# Patient Record
Sex: MALE | Race: WHITE | NOT HISPANIC OR LATINO | URBAN - METROPOLITAN AREA
[De-identification: names, ages, dates, MRNs, and addresses within clinical notes are randomized per-mention and may not be internally consistent; named-entity substitution may affect disease eponyms.]

---

## 2016-07-02 LAB
CHOLESTEROL (EXTERNAL): 208 MG/DL (ref 0–200)
CREATININE (EXTERNAL): 1 MG/DL (ref 0.4–1.3)
GFR ESTIMATED (EXTERNAL): NORMAL ML/MIN/1.73M2 (ref 0–0)
GFR ESTIMATED (IF AFRICAN AMERICAN) (EXTERNAL): NORMAL ML/MIN/1.73M2 (ref 0–0)
HDLC SERPL-MCNC: 45 MG/DL (ref 39–100)
LDL CHOLESTEROL CALCULATED (EXTERNAL): 113 MG/DL (ref 19–130)
NON HDL CHOLESTEROL (EXTERNAL): ABNORMAL MG/DL
TRIGLYCERIDES (EXTERNAL): 248 MG/DL (ref 0–149)

## 2020-02-17 ENCOUNTER — TRANSFERRED RECORDS (OUTPATIENT)
Dept: FAMILY MEDICINE | Facility: CLINIC | Age: 50
End: 2020-02-17

## 2020-02-17 LAB — HIV 1&2 EXT: NORMAL

## 2022-01-18 ENCOUNTER — OFFICE VISIT (OUTPATIENT)
Dept: FAMILY MEDICINE | Facility: CLINIC | Age: 52
End: 2022-01-18

## 2022-01-18 VITALS
HEIGHT: 72 IN | SYSTOLIC BLOOD PRESSURE: 136 MMHG | OXYGEN SATURATION: 97 % | DIASTOLIC BLOOD PRESSURE: 82 MMHG | HEART RATE: 63 BPM | WEIGHT: 199 LBS | BODY MASS INDEX: 26.95 KG/M2

## 2022-01-18 DIAGNOSIS — Z11.59 NEED FOR HEPATITIS C SCREENING TEST: ICD-10-CM

## 2022-01-18 DIAGNOSIS — F41.9 ANXIETY: ICD-10-CM

## 2022-01-18 DIAGNOSIS — Z00.00 ROUTINE GENERAL MEDICAL EXAMINATION AT A HEALTH CARE FACILITY: Primary | ICD-10-CM

## 2022-01-18 DIAGNOSIS — E78.5 HYPERLIPIDEMIA, UNSPECIFIED HYPERLIPIDEMIA TYPE: ICD-10-CM

## 2022-01-18 DIAGNOSIS — R73.01 IMPAIRED FASTING GLUCOSE: ICD-10-CM

## 2022-01-18 DIAGNOSIS — Z72.0 CHEWS TOBACCO REGULARLY: ICD-10-CM

## 2022-01-18 DIAGNOSIS — F32.0 CURRENT MILD EPISODE OF MAJOR DEPRESSIVE DISORDER WITHOUT PRIOR EPISODE (H): ICD-10-CM

## 2022-01-18 DIAGNOSIS — I48.0 PAROXYSMAL ATRIAL FIBRILLATION (H): ICD-10-CM

## 2022-01-18 PROBLEM — I48.91 ATRIAL FIBRILLATION (H): Status: ACTIVE | Noted: 2021-05-08

## 2022-01-18 PROBLEM — G47.33 MILD OBSTRUCTIVE SLEEP APNEA: Status: ACTIVE | Noted: 2021-09-18

## 2022-01-18 PROCEDURE — 36415 COLL VENOUS BLD VENIPUNCTURE: CPT | Performed by: FAMILY MEDICINE

## 2022-01-18 PROCEDURE — 99386 PREV VISIT NEW AGE 40-64: CPT | Performed by: FAMILY MEDICINE

## 2022-01-18 RX ORDER — BECLOMETHASONE DIPROPIONATE HFA 80 UG/1
AEROSOL, METERED RESPIRATORY (INHALATION)
COMMUNITY
Start: 2020-10-16 | End: 2022-10-20

## 2022-01-18 RX ORDER — FLUTICASONE PROPIONATE 50 MCG
2 SPRAY, SUSPENSION (ML) NASAL
COMMUNITY

## 2022-01-18 RX ORDER — CHLORAL HYDRATE 500 MG
2 CAPSULE ORAL
COMMUNITY
Start: 2020-02-25 | End: 2024-06-07

## 2022-01-18 RX ORDER — ESCITALOPRAM OXALATE 20 MG/1
1 TABLET ORAL DAILY
COMMUNITY
Start: 2021-07-10 | End: 2022-10-20

## 2022-01-18 RX ORDER — FENOFIBRATE 160 MG/1
1 TABLET ORAL DAILY
COMMUNITY
Start: 2021-08-10 | End: 2022-10-20

## 2022-01-18 ASSESSMENT — ANXIETY QUESTIONNAIRES
IF YOU CHECKED OFF ANY PROBLEMS ON THIS QUESTIONNAIRE, HOW DIFFICULT HAVE THESE PROBLEMS MADE IT FOR YOU TO DO YOUR WORK, TAKE CARE OF THINGS AT HOME, OR GET ALONG WITH OTHER PEOPLE: SOMEWHAT DIFFICULT
5. BEING SO RESTLESS THAT IT IS HARD TO SIT STILL: NOT AT ALL
2. NOT BEING ABLE TO STOP OR CONTROL WORRYING: NOT AT ALL
GAD7 TOTAL SCORE: 2
3. WORRYING TOO MUCH ABOUT DIFFERENT THINGS: NOT AT ALL
1. FEELING NERVOUS, ANXIOUS, OR ON EDGE: SEVERAL DAYS
6. BECOMING EASILY ANNOYED OR IRRITABLE: NOT AT ALL
7. FEELING AFRAID AS IF SOMETHING AWFUL MIGHT HAPPEN: NOT AT ALL

## 2022-01-18 ASSESSMENT — MIFFLIN-ST. JEOR: SCORE: 1797.72

## 2022-01-18 ASSESSMENT — PATIENT HEALTH QUESTIONNAIRE - PHQ9
5. POOR APPETITE OR OVEREATING: SEVERAL DAYS
SUM OF ALL RESPONSES TO PHQ QUESTIONS 1-9: 7

## 2022-01-18 NOTE — PATIENT INSTRUCTIONS
Preventive Health Recommendations  Male Ages 40 to 49    Yearly exam:             See your health care provider every year in order to  o   Review health changes.   o   Discuss preventive care.    o   Review your medicines if your doctor has prescribed any.    You should be tested each year for STDs (sexually transmitted diseases) if you re at risk.     Have a cholesterol test every 5 years.     Have a colonoscopy (test for colon cancer) if someone in your family has had colon cancer or polyps before age 50.     After age 45, have a diabetes test (fasting glucose). If you are at risk for diabetes, you should have this test every 3 years.      Talk with your health care provider about whether or not a prostate cancer screening test (PSA) is right for you.    Shots: Get a flu shot each year. Get a tetanus shot every 10 years.     Nutrition:    Eat at least 5 servings of fruits and vegetables daily.     Eat whole-grain bread, whole-wheat pasta and brown rice instead of white grains and rice.     Get adequate Calcium and Vitamin D.     Lifestyle    Exercise for at least 150 minutes a week (30 minutes a day, 5 days a week). This will help you control your weight and prevent disease.     Limit alcohol to one drink per day.     No smoking.     Wear sunscreen to prevent skin cancer.     See your dentist every six months for an exam and cleaning.    My Asthma Action Plan    Name: Dayo Richards   YOB: 1970  Date: 1/18/2022   My doctor: Shazia Sandoval MD   My clinic: [facility]        My Rescue Medicine:   Albuterol inhaler (Proair/Ventolin/Proventil HFA)  2-4 puffs EVERY 4 HOURS as needed. Use a spacer if recommended by your provider.   My Asthma Severity:   Mild Persistent  Know your asthma triggers: pollens             GREEN ZONE   Good Control    I feel good    No cough or wheeze    Can work, sleep and play without asthma symptoms       Take your asthma control medicine every day.     1. If  exercise triggers your asthma, take your rescue medication    15 minutes before exercise or sports, and    During exercise if you have asthma symptoms  2. Spacer to use with inhaler: If you have a spacer, make sure to use it with your inhaler             YELLOW ZONE Getting Worse  I have ANY of these:    I do not feel good    Cough or wheeze    Chest feels tight    Wake up at night   1. Keep taking your Green Zone medications  2. Start taking your rescue medicine:    every 20 minutes for up to 1 hour. Then every 4 hours for 24-48 hours.  3. If you stay in the Yellow Zone for more than 12-24 hours, contact your doctor.  4. If you do not return to the Green Zone in 12-24 hours or you get worse, start taking your oral steroid medicine if prescribed by your provider.           RED ZONE Medical Alert - Get Help  I have ANY of these:    I feel awful    Medicine is not helping    Breathing getting harder    Trouble walking or talking    Nose opens wide to breathe       1. Take your rescue medicine NOW  2. If your provider has prescribed an oral steroid medicine, start taking it NOW  3. Call your doctor NOW  4. If you are still in the Red Zone after 20 minutes and you have not reached your doctor:    Take your rescue medicine again and    Call 911 or go to the emergency room right away    See your regular doctor within 2 weeks of an Emergency Room or Urgent Care visit for follow-up treatment.          Annual Reminders:  Meet with Asthma Educator,  Flu Shot in the Fall, consider Pneumonia Vaccination for patients with asthma (aged 19 and older).    Pharmacy: [facility] [id number] [address]    Electronically signed by Shazia Sandoval MD   Date: 01/18/22                    Asthma Triggers  How To Control Things That Make Your Asthma Worse    Triggers are things that make your asthma worse.  Look at the list below to help you find your triggers and   what you can do about them. You can help prevent  asthma flare-ups by staying away from your triggers.      Trigger                                                          What you can do   Cigarette Smoke  Tobacco smoke can make asthma worse. Do not allow smoking in your home, car or around you.  Be sure no one smokes at a child s day care or school.  If you smoke, ask your health care provider for ways to help you quit.  Ask family members to quit too.  Ask your health care provider for a referral to Quit Plan to help you quit smoking, or call 0-096-362-PLAN.     Colds, Flu, Bronchitis  These are common triggers of asthma. Wash your hands often.  Don t touch your eyes, nose or mouth.  Get a flu shot every year.     Dust Mites  These are tiny bugs that live in cloth or carpet. They are too small to see. Wash sheets and blankets in hot water every week.   Encase pillows and mattress in dust mite proof covers.  Avoid having carpet if you can. If you have carpet, vacuum weekly.   Use a dust mask and HEPA vacuum.   Pollen and Outdoor Mold  Some people are allergic to trees, grass, or weed pollen, or molds. Try to keep your windows closed.  Limit time out doors when pollen count is high.   Ask you health care provider about taking medicine during allergy season.     Animal Dander  Some people are allergic to skin flakes, urine or saliva from pets with fur or feathers. Keep pets with fur or feathers out of your home.    If you can t keep the pet outdoors, then keep the pet out of your bedroom.  Keep the bedroom door closed.  Keep pets off cloth furniture and away from stuffed toys.     Mice, Rats, and Cockroaches  Some people are allergic to the waste from these pests.   Cover food and garbage.  Clean up spills and food crumbs.  Store grease in the refrigerator.   Keep food out of the bedroom.   Indoor Mold  This can be a trigger if your home has high moisture. Fix leaking faucets, pipes, or other sources of water.   Clean moldy surfaces.  Dehumidify basement if it is  damp and smelly.   Smoke, Strong Odors, and Sprays  These can reduce air quality. Stay away from strong odors and sprays, such as perfume, powder, hair spray, paints, smoke incense, paint, cleaning products, candles and new carpet.   Exercise or Sports  Some people with asthma have this trigger. Be active!  Ask your doctor about taking medicine before sports or exercise to prevent symptoms.    Warm up for 5-10 minutes before and after sports or exercise.     Other Triggers of Asthma  Cold air:  Cover your nose and mouth with a scarf.  Sometimes laughing or crying can be a trigger.  Some medicines and food can trigger asthma.

## 2022-01-18 NOTE — PROGRESS NOTES
Male Preventative Health Visit     SUBJECTIVE:    This 51 year old male presents for a routine preventive physical exam. Prior patient for mine, [place] [facility]    The patient has the following concerns:   1. Paroxysmal Afib : detected after syncopal episode from [place] with very low burden. Follows with cardiology (Dr. Petit at [facility] : prescribes fenofibrate and lipitor). Not anticoagulated.   2. LIZET mild : does not require CPAP or dental device.   3. Asthma : Qvar 1 puff in the am. Tiggers : pollen, seasonal allergies.   4. Depression: situational stress related to wife's current low mood also. taking lexapro 20 mg with good effect. Was noticing an increase in beer consumption up to 3 per night in the last few months. Has now scaled back to 2 beers every week. Also smokes marijuana recreationally. Stopped chewing loose tobacco few years ago, but does chew tobacco pouches every day.  5. GERD : prilosec  6. Elevated BP: blood pressure at home around 130/75 -80.  7. BMI 27 : aware he has become more sedentary. Relates to COVID and mood. Would like to become more active.     Patient's medications, allergies, past medical, surgical and family histories were reviewed and updated as appropriate.    Health Maintenance  Health maintenance alerts were reviewed and updated as appropriate.  Colorectal cancer screening: UTD as per patient report :within normal limits as per patient report. Completed at [facility] at age 48 due to father with Hx of polyps. On 5 year FU.     Healthy Habits:    Do you get at least three servings of calcium containing foods daily (dairy, green leafy vegetables, etc.)? no    Amount of exercise or daily activities, outside of work: 0 hour(s) per day    Problems taking medications regularly No    Medication side effects: No    Have you had an eye exam in the past two years? yes    Do you see a dentist twice per year? yes    Do you have sleep apnea, excessive snoring or daytime  "drowsiness?no    OBJECTIVE:    Vitals:    01/18/22 1036 01/18/22 1133   BP: (!) 146/84 136/82   Pulse: 63    SpO2: 97%    Weight: 90.3 kg (199 lb)    Height: 1.816 m (5' 11.5\")     Body mass index is 27.37 kg/m .  General: no acute distress, cooperative with exam.  HEENT:  PERRLA. Bilateral TM's, external canals, oropharynx normal.    Neck:  Supple, no lymphadenopathy or thyromegaly   Lungs: clear to auscultation bilaterally, normal respiratory effort.  Heart:  RRR without murmurs, rubs or gallops.  Normal S1 and S2.  Abdomen: normal bowel sounds, nontender, no palpable organomegaly.  Skin:  No lesions.  No rashes.  Extremities: warm, perfused, no swelling or edema.  Neuro:  CN II-XII intact, motor & sensory function all intact.    Psych: mental status normal, mood and affect appropriate.    PHQ 1/18/2022   PHQ-9 Total Score 7   Q9: Thoughts of better off dead/self-harm past 2 weeks Not at all     ELMER-7 SCORE 1/18/2022   Total Score 2       ASSESSMENT / PLAN: This 51 year old male presents for a routine preventive physical exam. Preventive health topics discussed including adequate exercise and healthy diet. Return to clinic in one year for preventative exam or sooner with any other concerns.  Other issues discussed as noted below.      Routine general medical examination at a health care facility    Impaired fasting glucose  BMI 27.0-27.9,adult  -     Hemoglobin A1C ([facility])    Need for hepatitis C screening test  -     HCV Antibody ([facility])    Current mild episode of major depressive disorder without prior episode (H)  Anxiety   Continue Lexapro. After discussion, open to adding in individual psychotherapy.   -     Adult Mental Health Referral; Future    Paroxysmal atrial fibrillation (H)  Low burden. Not anticoagulated. Follows with cardiology.     Chews tobacco regularly  Counseled on quitting.     Hyperlipidemia, unspecified hyperlipidemia type  -     Lipid Panel ([facility])    "

## 2022-01-19 ASSESSMENT — ANXIETY QUESTIONNAIRES: GAD7 TOTAL SCORE: 2

## 2022-01-19 ASSESSMENT — ASTHMA QUESTIONNAIRES: ACT_TOTALSCORE: 24

## 2022-01-20 LAB
CHOLEST SERPL-MCNC: 250 MG/DL (ref 100–199)
HBA1C MFR BLD: 5.7 % (ref 4.8–5.6)
HCV AB SERPL QL IA: 0.1 S/CO RATIO (ref 0–0.9)
HDLC SERPL-MCNC: 50 MG/DL
LDL/HDL RATIO: 3.3 RATIO (ref 0–3.6)
LDLC SERPL CALC-MCNC: 163 MG/DL (ref 0–99)
TRIGL SERPL-MCNC: 203 MG/DL (ref 0–149)
VLDLC SERPL CALC-MCNC: 37 MG/DL (ref 5–40)

## 2022-02-21 ENCOUNTER — TRANSFERRED RECORDS (OUTPATIENT)
Dept: FAMILY MEDICINE | Facility: CLINIC | Age: 52
End: 2022-02-21

## 2022-10-01 ENCOUNTER — HEALTH MAINTENANCE LETTER (OUTPATIENT)
Age: 52
End: 2022-10-01

## 2022-10-20 ENCOUNTER — OFFICE VISIT (OUTPATIENT)
Dept: FAMILY MEDICINE | Facility: CLINIC | Age: 52
End: 2022-10-20

## 2022-10-20 VITALS
BODY MASS INDEX: 27.07 KG/M2 | RESPIRATION RATE: 16 BRPM | DIASTOLIC BLOOD PRESSURE: 80 MMHG | SYSTOLIC BLOOD PRESSURE: 126 MMHG | HEART RATE: 60 BPM | WEIGHT: 196.8 LBS | OXYGEN SATURATION: 97 %

## 2022-10-20 DIAGNOSIS — R73.01 IMPAIRED FASTING GLUCOSE: ICD-10-CM

## 2022-10-20 DIAGNOSIS — E78.5 HYPERLIPIDEMIA, UNSPECIFIED HYPERLIPIDEMIA TYPE: Primary | ICD-10-CM

## 2022-10-20 DIAGNOSIS — J45.40 MODERATE PERSISTENT ASTHMA WITHOUT COMPLICATION: ICD-10-CM

## 2022-10-20 DIAGNOSIS — F41.9 ANXIETY: ICD-10-CM

## 2022-10-20 DIAGNOSIS — K21.9 GASTROESOPHAGEAL REFLUX DISEASE WITHOUT ESOPHAGITIS: ICD-10-CM

## 2022-10-20 DIAGNOSIS — F32.0 CURRENT MILD EPISODE OF MAJOR DEPRESSIVE DISORDER WITHOUT PRIOR EPISODE (H): ICD-10-CM

## 2022-10-20 DIAGNOSIS — I48.0 PAROXYSMAL ATRIAL FIBRILLATION (H): ICD-10-CM

## 2022-10-20 LAB
CHOL/HDL RATIO (RMG): 4.9 MG/DL (ref 0–4.5)
CHOLESTEROL: 249 MG/DL (ref 100–199)
HDL (RMG): 51 MG/DL (ref 40–?)
LDL CALCULATED (RMG): 157 MG/DL (ref 0–130)
TRIGLYCERIDES (RMG): 204 MG/DL (ref 0–149)

## 2022-10-20 PROCEDURE — 80061 LIPID PANEL: CPT | Mod: QW | Performed by: FAMILY MEDICINE

## 2022-10-20 PROCEDURE — 36415 COLL VENOUS BLD VENIPUNCTURE: CPT | Performed by: FAMILY MEDICINE

## 2022-10-20 PROCEDURE — 99214 OFFICE O/P EST MOD 30 MIN: CPT | Performed by: FAMILY MEDICINE

## 2022-10-20 RX ORDER — TRIAMCINOLONE ACETONIDE 1 MG/G
OINTMENT TOPICAL
COMMUNITY
Start: 2022-08-29 | End: 2023-03-10

## 2022-10-20 RX ORDER — FENOFIBRATE 160 MG/1
160 TABLET ORAL DAILY
Qty: 90 TABLET | Refills: 3 | Status: SHIPPED | OUTPATIENT
Start: 2022-10-20 | End: 2023-12-12

## 2022-10-20 RX ORDER — ATORVASTATIN CALCIUM 40 MG/1
40 TABLET, FILM COATED ORAL DAILY
Qty: 90 TABLET | Refills: 3 | Status: SHIPPED | OUTPATIENT
Start: 2022-10-20 | End: 2022-12-09

## 2022-10-20 RX ORDER — ESCITALOPRAM OXALATE 20 MG/1
20 TABLET ORAL DAILY
Qty: 90 TABLET | Refills: 3 | Status: SHIPPED | OUTPATIENT
Start: 2022-10-20 | End: 2023-03-10

## 2022-10-20 RX ORDER — BECLOMETHASONE DIPROPIONATE HFA 80 UG/1
1 AEROSOL, METERED RESPIRATORY (INHALATION) DAILY
Qty: 10.6 G | Refills: 11 | Status: SHIPPED | OUTPATIENT
Start: 2022-10-20 | End: 2024-06-07

## 2022-10-20 RX ORDER — ATORVASTATIN CALCIUM 40 MG/1
1 TABLET, FILM COATED ORAL DAILY
COMMUNITY
Start: 2022-08-30 | End: 2022-10-20

## 2022-10-20 ASSESSMENT — PATIENT HEALTH QUESTIONNAIRE - PHQ9
5. POOR APPETITE OR OVEREATING: SEVERAL DAYS
SUM OF ALL RESPONSES TO PHQ QUESTIONS 1-9: 10

## 2022-10-20 ASSESSMENT — ANXIETY QUESTIONNAIRES
3. WORRYING TOO MUCH ABOUT DIFFERENT THINGS: NOT AT ALL
5. BEING SO RESTLESS THAT IT IS HARD TO SIT STILL: NOT AT ALL
2. NOT BEING ABLE TO STOP OR CONTROL WORRYING: NOT AT ALL
6. BECOMING EASILY ANNOYED OR IRRITABLE: NOT AT ALL
IF YOU CHECKED OFF ANY PROBLEMS ON THIS QUESTIONNAIRE, HOW DIFFICULT HAVE THESE PROBLEMS MADE IT FOR YOU TO DO YOUR WORK, TAKE CARE OF THINGS AT HOME, OR GET ALONG WITH OTHER PEOPLE: NOT DIFFICULT AT ALL
7. FEELING AFRAID AS IF SOMETHING AWFUL MIGHT HAPPEN: NOT AT ALL
GAD7 TOTAL SCORE: 1
GAD7 TOTAL SCORE: 1
1. FEELING NERVOUS, ANXIOUS, OR ON EDGE: NOT AT ALL

## 2022-10-20 NOTE — PROGRESS NOTES
SUBJECTIVE:    Dayo Richards, is a 52 year old male presenting for the below:     1. Paroxysmal Afib : detected after syncopal episode from [facility] with very low burden. Follows with cardiology   2. Hypercholesterolemia : Was following with Dr. Petit at [facility] that provider has now retired: prescribed fenofibrate and lipitor). Not anticoagulated.   3. LIZET mild : does not require CPAP or dental device.   Smokes marijuana recreationally. Stopped chewing loose tobacco few years ago, but does chew tobacco pouches every day.  4. GERD : prilosec  5. BMI 27, A1c 5.7% Jan 2022.   6. Depression / ELMER : lots of situational stress (mother in law with dementia, work stress). Some anhedonia and insomnia. Continues moderate alcohol consumption just when going out. Feels under control.     Answers for HPI/ROS submitted by the patient on 10/20/2022  What is the reason for your visit today? : Med check for cholesterol.  How many servings of fruits and vegetables do you eat daily?: 0-1  On average, how many sweetened beverages do you drink each day (Examples: soda, juice, sweet tea, etc.  Do NOT count diet or artificially sweetened beverages)?: 0  How many minutes a day do you exercise enough to make your heart beat faster?: 9 or less  How many days a week do you exercise enough to make your heart beat faster?: 3 or less  How many days per week do you miss taking your medication?: 0       OBJECTIVE:  Vitals:    10/20/22 0802   BP: 126/80   Pulse: 60   Resp: 16   SpO2: 97%   Weight: 89.3 kg (196 lb 12.8 oz)    Body mass index is 27.07 kg/m .    General: no acute distress, cooperative with exam.  Lungs: clear to auscultation bilaterally, normal respiratory effort.  Heart: regular rate, normal S1 and S2, no murmurs.   Extremities: warm, perfused, no swelling or edema.    PHQ 1/18/2022 10/20/2022   PHQ-9 Total Score 7 10   Q9: Thoughts of better off dead/self-harm past 2 weeks Not at all Not at all     ELMER-7 SCORE 1/18/2022  10/20/2022   Total Score 2 1       ASSESSMENT / PLAN:      Hyperlipidemia, unspecified hyperlipidemia type  Paroxysmal Afib.  -     Lipid Profile ([facility])  -     fenofibrate (TRIGLIDE/LOFIBRA) 160 MG tablet; Take 1 tablet (160 mg) by mouth daily  -     atorvastatin (LIPITOR) 40 MG tablet; Take 1 tablet (40 mg) by mouth daily    Impaired fasting glucose  BMI 27  -     Hemoglobin A1C ([facility])    Moderate persistent asthma without complication  -     beclomethasone HFA (QVAR REDIHALER) 80 MCG/ACT inhaler; Inhale 1 puff into the lungs daily    Current mild episode of major depressive disorder without prior episode (H)  Anxiety  -     escitalopram (LEXAPRO) 20 MG tablet; Take 1 tablet (20 mg) by mouth daily    Gastroesophageal reflux disease without esophagitis  -     omeprazole (PRILOSEC) 20 MG DR capsule; Take 1 capsule (20 mg) by mouth daily

## 2022-10-21 LAB — HBA1C MFR BLD: 5.9 % (ref 4.8–5.6)

## 2022-12-09 ENCOUNTER — TELEPHONE (OUTPATIENT)
Dept: FAMILY MEDICINE | Facility: CLINIC | Age: 52
End: 2022-12-09

## 2022-12-09 DIAGNOSIS — E78.5 HYPERLIPIDEMIA, UNSPECIFIED HYPERLIPIDEMIA TYPE: ICD-10-CM

## 2022-12-09 RX ORDER — ATORVASTATIN CALCIUM 80 MG/1
80 TABLET, FILM COATED ORAL DAILY
Qty: 90 TABLET | Refills: 3 | Status: SHIPPED | OUTPATIENT
Start: 2022-12-09 | End: 2023-12-12

## 2022-12-09 NOTE — TELEPHONE ENCOUNTER
Patient increased atorvastatin to 80mg per Dr Sandoval after labs done 10/20/22. Patient requests new prescription for this increased dose be sent to pharmacy. Prescription sent and message left for patient that prescription was sent. Nalini Kee    Thelma

## 2023-03-02 NOTE — PROGRESS NOTES
"Male Preventative Health Visit     SUBJECTIVE:    This 53 year old male presents for a routine preventive physical exam.    The patient has the following concerns:     1. Paroxysmal Afib : detected after syncopal episode from [facility] with very low burden. Follows with cardiology. Not anticoagulated.   2. Hypercholesterolemia : Was following with Dr. Petit at [facility] that provider has now retired: prescribed fenofibrate and lipitor.   3. LIZET mild : does not require CPAP or dental device. y.  4. GERD : prilosec  5. BMI 26: has weaned down on beer intake and eating out less.   6. Depression / ELMER : situational stress with recent job loss. Lexapro 20 mg daily. Feels coping well. Feels optimistic about getting another job soon.   7. Pigmented patch of skin to right upper cheek. Slowing enlarging in size.   8. Asthma : seasonal : uses ICS daily. Rare use of albuterol.      Patient's medications, allergies, past medical, surgical and family histories were reviewed and updated as appropriate.    Health Maintenance  Health maintenance alerts were reviewed and updated as appropriate.  Colorectal cancer screening: due 5 year follow up for H/o sessile adenoma.       Healthy Habits:  Answers for HPI/ROS submitted by the patient on 3/10/2023  Frequency of exercise:: None  Getting at least 3 servings of Calcium per day:: Yes  Diet:: Regular (no restrictions)  Taking medications regularly:: Yes  Medication side effects:: None  Bi-annual eye exam:: Yes  Dental care twice a year:: Yes  Sleep apnea or symptoms of sleep apnea:: None  nervous/anxious: Yes  Additional concerns today:: Yes      OBJECTIVE:    Vitals:    03/10/23 1006   BP: 132/80   Pulse: 66   Resp: 16   Temp: 97.8  F (36.6  C)   SpO2: 99%   Weight: 86.8 kg (191 lb 6.4 oz)   Height: 1.797 m (5' 10.75\")    Body mass index is 26.88 kg/m .  General: no acute distress, cooperative with exam.  HEENT:  PERRLA. Bilateral TM's, external canals, oropharynx normal.    Neck:  " Supple, no lymphadenopathy or thyromegaly   Lungs: clear to auscultation bilaterally, normal respiratory effort.  Heart:  RRR without murmurs, rubs or gallops.  Normal S1 and S2.  Abdomen: normal bowel sounds, nontender, no palpable organomegaly.  Extremities: warm, perfused, no swelling or edema.  Neuro:  CN II-XII intact, motor & sensory function all intact.    Psych: mental status normal, mood and affect appropriate.    Cryotherapy procedure     Location:approx 0.5 x 0.3 cm tan flat patch of skin with no variation in pigmentation to right upper cheek. Some overlying fine scale. In keeping with early SK or solar lentigo.     The procedure, risks and complications were discussed. Verbal consent was obtained for the procedure.     PROCEDURE:     Cryotherapy with liquid nitrogen, was applied for 2 freeze thaw cycles.        PHQ 1/18/2022 10/20/2022   PHQ-9 Total Score 7 10   Q9: Thoughts of better off dead/self-harm past 2 weeks Not at all Not at all       ASSESSMENT / PLAN: This 53 year old male presents for a routine preventive physical exam. Preventive health topics discussed including adequate exercise and healthy diet. Return to clinic in one year for preventative exam or sooner with any other concerns.  Other issues discussed as noted below.      Routine general medical examination at a health care facility    Anxiety  Current mild episode of major depressive disorder without prior episode (H)  Situational stress with recent job loss. Lexapro 20 mg daily. Feels coping well. Feels optimistic about getting another job soon.   -     escitalopram (LEXAPRO) 20 MG tablet; Take 1 tablet (20 mg) by mouth daily    Paroxysmal atrial fibrillation (H)  Detected after syncopal episode from [employer] with very low burden. Follows with cardiology. Not anticoagulated. Denies any palpitations. In NSR today.     Hyperlipidemia, unspecified hyperlipidemia type  Atorvastatin and fenofibrate (started by preventative cardiologist who  has since retired).  -     VENOUS COLLECTION  -     Lipid Profile ([facility])    H/O adenomatous polyp of colon  Screening for colorectal cancer  Due 5 year follow up   -     Colonoscopy Screening  Referral; Future    Need for immunization against tetanus alone  -     TDAP VACCINE  -     VACCINE ADMINISTRATION, INITIAL    Mild persistent asthma without complication  Seasonal : uses ICS daily. Rare use of albuterol.     Solar lentigo  Follow up: The patient tolerated the procedure well without complications.  Standard post-procedure care was explained.  Will need excision if reoccurs after cryotherapy. Patient expressed understanding and agreement with the plan.    -     DESTRUCT BENIGN LESION, UP TO 14

## 2023-03-10 ENCOUNTER — OFFICE VISIT (OUTPATIENT)
Dept: FAMILY MEDICINE | Facility: CLINIC | Age: 53
End: 2023-03-10

## 2023-03-10 VITALS
OXYGEN SATURATION: 99 % | WEIGHT: 191.4 LBS | RESPIRATION RATE: 16 BRPM | BODY MASS INDEX: 26.8 KG/M2 | SYSTOLIC BLOOD PRESSURE: 132 MMHG | DIASTOLIC BLOOD PRESSURE: 80 MMHG | HEIGHT: 71 IN | TEMPERATURE: 97.8 F | HEART RATE: 66 BPM

## 2023-03-10 DIAGNOSIS — F41.9 ANXIETY: ICD-10-CM

## 2023-03-10 DIAGNOSIS — Z00.00 ROUTINE GENERAL MEDICAL EXAMINATION AT A HEALTH CARE FACILITY: Primary | ICD-10-CM

## 2023-03-10 DIAGNOSIS — Z12.11 SCREENING FOR COLORECTAL CANCER: ICD-10-CM

## 2023-03-10 DIAGNOSIS — E78.5 HYPERLIPIDEMIA, UNSPECIFIED HYPERLIPIDEMIA TYPE: ICD-10-CM

## 2023-03-10 DIAGNOSIS — F32.0 CURRENT MILD EPISODE OF MAJOR DEPRESSIVE DISORDER WITHOUT PRIOR EPISODE (H): ICD-10-CM

## 2023-03-10 DIAGNOSIS — I48.0 PAROXYSMAL ATRIAL FIBRILLATION (H): ICD-10-CM

## 2023-03-10 DIAGNOSIS — Z86.0101 H/O ADENOMATOUS POLYP OF COLON: ICD-10-CM

## 2023-03-10 DIAGNOSIS — Z23 NEED FOR IMMUNIZATION AGAINST TETANUS ALONE: ICD-10-CM

## 2023-03-10 DIAGNOSIS — J45.30 MILD PERSISTENT ASTHMA WITHOUT COMPLICATION: ICD-10-CM

## 2023-03-10 DIAGNOSIS — L81.4 SOLAR LENTIGO: ICD-10-CM

## 2023-03-10 DIAGNOSIS — Z12.12 SCREENING FOR COLORECTAL CANCER: ICD-10-CM

## 2023-03-10 LAB
CHOL/HDL RATIO (RMG): 5.5 MG/DL (ref 0–4.5)
CHOLESTEROL: 231 MG/DL (ref 100–199)
HDL (RMG): 42 MG/DL (ref 40–?)
LDL CALCULATED (RMG): 143 MG/DL (ref 0–130)
TRIGLYCERIDES (RMG): 231 MG/DL (ref 0–149)

## 2023-03-10 PROCEDURE — 36415 COLL VENOUS BLD VENIPUNCTURE: CPT | Performed by: FAMILY MEDICINE

## 2023-03-10 PROCEDURE — 80061 LIPID PANEL: CPT | Mod: QW | Performed by: FAMILY MEDICINE

## 2023-03-10 PROCEDURE — 17110 DESTRUCTION B9 LES UP TO 14: CPT | Mod: 59 | Performed by: FAMILY MEDICINE

## 2023-03-10 PROCEDURE — 99213 OFFICE O/P EST LOW 20 MIN: CPT | Mod: 25 | Performed by: FAMILY MEDICINE

## 2023-03-10 PROCEDURE — 99396 PREV VISIT EST AGE 40-64: CPT | Performed by: FAMILY MEDICINE

## 2023-03-10 PROCEDURE — 90471 IMMUNIZATION ADMIN: CPT | Mod: 59 | Performed by: FAMILY MEDICINE

## 2023-03-10 PROCEDURE — 90715 TDAP VACCINE 7 YRS/> IM: CPT | Performed by: FAMILY MEDICINE

## 2023-03-10 RX ORDER — ESCITALOPRAM OXALATE 20 MG/1
20 TABLET ORAL DAILY
Qty: 90 TABLET | Refills: 3 | Status: SHIPPED | OUTPATIENT
Start: 2023-03-10 | End: 2024-03-22

## 2023-03-10 ASSESSMENT — ENCOUNTER SYMPTOMS
NAUSEA: 0
MYALGIAS: 0
ARTHRALGIAS: 0
EYE PAIN: 0
WEAKNESS: 0
FEVER: 0
JOINT SWELLING: 0
PARESTHESIAS: 0
COUGH: 0
SHORTNESS OF BREATH: 0
DIARRHEA: 0
FREQUENCY: 0
DIZZINESS: 0
HEADACHES: 0
HEMATOCHEZIA: 0
SORE THROAT: 0
NERVOUS/ANXIOUS: 1
HEARTBURN: 0
DYSURIA: 0
PALPITATIONS: 0
ABDOMINAL PAIN: 0
CONSTIPATION: 0
HEMATURIA: 0
CHILLS: 0

## 2023-03-10 ASSESSMENT — PATIENT HEALTH QUESTIONNAIRE - PHQ9
5. POOR APPETITE OR OVEREATING: SEVERAL DAYS
SUM OF ALL RESPONSES TO PHQ QUESTIONS 1-9: 2

## 2023-03-10 ASSESSMENT — ANXIETY QUESTIONNAIRES
1. FEELING NERVOUS, ANXIOUS, OR ON EDGE: MORE THAN HALF THE DAYS
6. BECOMING EASILY ANNOYED OR IRRITABLE: SEVERAL DAYS
GAD7 TOTAL SCORE: 8
7. FEELING AFRAID AS IF SOMETHING AWFUL MIGHT HAPPEN: SEVERAL DAYS
IF YOU CHECKED OFF ANY PROBLEMS ON THIS QUESTIONNAIRE, HOW DIFFICULT HAVE THESE PROBLEMS MADE IT FOR YOU TO DO YOUR WORK, TAKE CARE OF THINGS AT HOME, OR GET ALONG WITH OTHER PEOPLE: SOMEWHAT DIFFICULT
2. NOT BEING ABLE TO STOP OR CONTROL WORRYING: MORE THAN HALF THE DAYS
GAD7 TOTAL SCORE: 8
3. WORRYING TOO MUCH ABOUT DIFFERENT THINGS: SEVERAL DAYS
5. BEING SO RESTLESS THAT IT IS HARD TO SIT STILL: NOT AT ALL

## 2023-12-11 ENCOUNTER — PATIENT OUTREACH (OUTPATIENT)
Dept: GASTROENTEROLOGY | Facility: CLINIC | Age: 53
End: 2023-12-11

## 2023-12-12 DIAGNOSIS — E78.5 HYPERLIPIDEMIA, UNSPECIFIED HYPERLIPIDEMIA TYPE: ICD-10-CM

## 2023-12-12 DIAGNOSIS — K21.9 GASTROESOPHAGEAL REFLUX DISEASE WITHOUT ESOPHAGITIS: ICD-10-CM

## 2023-12-12 RX ORDER — FENOFIBRATE 160 MG/1
160 TABLET ORAL DAILY
Qty: 90 TABLET | Refills: 0 | Status: SHIPPED | OUTPATIENT
Start: 2023-12-12 | End: 2024-03-22

## 2023-12-12 RX ORDER — ATORVASTATIN CALCIUM 80 MG/1
80 TABLET, FILM COATED ORAL DAILY
Qty: 90 TABLET | Refills: 0 | Status: SHIPPED | OUTPATIENT
Start: 2023-12-12 | End: 2024-04-19

## 2023-12-13 NOTE — CONFIDENTIAL NOTE
"Med: ATORVASTATIN, OMEPRAZOLE, FENOFIBRATE    LOV (related): 3/10/23 - CPX    Last Lab: 3/10/23      Due for F/U around: 3/2024 FOR CPX    Next Appt: NONE        Cholesterol   Date Value Ref Range Status   03/10/2023 231 (A) 100 - 199 mg/dl Final   10/20/2022 249 (A) 100 - 199 mg/dl Final   01/18/2022 250 (H) 100 - 199 mg/dL Final     HDL Cholesterol   Date Value Ref Range Status   01/18/2022 50 >39 mg/dL Final     HDL   Date Value Ref Range Status   03/10/2023 42 40 mg/dl Final   10/20/2022 51 40 mg/dl Final     LDL Cholesterol Calculated   Date Value Ref Range Status   01/18/2022 163 (H) 0 - 99 mg/dL Final     LDL CALCULATED ([facility])   Date Value Ref Range Status   03/10/2023 143 (A) 0 - 130 mg/dl Final   10/20/2022 157 (A) 0 - 130 mg/dl Final     Triglycerides   Date Value Ref Range Status   03/10/2023 231 (A) 0 - 149 mg/dl Final   10/20/2022 204 (A) 0 - 149 mg/dl Final   01/18/2022 203 (H) 0 - 149 mg/dL Final     No results found for: \"CHOLHDLRATIO\"     "

## 2023-12-27 DIAGNOSIS — Z23 NEED FOR INFLUENZA VACCINATION: Primary | ICD-10-CM

## 2023-12-27 DIAGNOSIS — Z23 NEED FOR COVID-19 VACCINE: ICD-10-CM

## 2023-12-27 PROCEDURE — 90471 IMMUNIZATION ADMIN: CPT | Performed by: FAMILY MEDICINE

## 2023-12-27 PROCEDURE — 91322 SARSCOV2 VAC 50 MCG/0.5ML IM: CPT | Performed by: FAMILY MEDICINE

## 2023-12-27 PROCEDURE — 90674 CCIIV4 VAC NO PRSV 0.5 ML IM: CPT | Performed by: FAMILY MEDICINE

## 2023-12-27 PROCEDURE — 90480 ADMN SARSCOV2 VAC 1/ONLY CMP: CPT | Performed by: FAMILY MEDICINE

## 2024-01-22 ENCOUNTER — PATIENT OUTREACH (OUTPATIENT)
Dept: GASTROENTEROLOGY | Facility: CLINIC | Age: 54
End: 2024-01-22

## 2024-01-22 NOTE — PROGRESS NOTES
Patient part of [facility] and does not meet inclusion criteria for management by CRC team.

## 2024-03-12 DIAGNOSIS — E78.5 HYPERLIPIDEMIA, UNSPECIFIED HYPERLIPIDEMIA TYPE: ICD-10-CM

## 2024-03-12 DIAGNOSIS — K21.9 GASTROESOPHAGEAL REFLUX DISEASE WITHOUT ESOPHAGITIS: ICD-10-CM

## 2024-03-12 RX ORDER — FENOFIBRATE 160 MG/1
160 TABLET ORAL DAILY
Qty: 90 TABLET | Refills: 0 | OUTPATIENT
Start: 2024-03-12

## 2024-03-12 RX ORDER — ATORVASTATIN CALCIUM 80 MG/1
80 TABLET, FILM COATED ORAL DAILY
Qty: 90 TABLET | Refills: 0 | OUTPATIENT
Start: 2024-03-12

## 2024-03-12 NOTE — TELEPHONE ENCOUNTER
Med: atorvastatin 80mg and fenofibrate 160mg    LOV (related): 03/10/2023    Last Lab: 03/10/2023      Due for F/U around: overdue for cpx    Next Appt: 04/12/2024        Cholesterol   Date Value Ref Range Status   03/10/2023 231 (A) 100 - 199 mg/dl Final   10/20/2022 249 (A) 100 - 199 mg/dl Final   01/18/2022 250 (H) 100 - 199 mg/dL Final     HDL Cholesterol   Date Value Ref Range Status   01/18/2022 50 >39 mg/dL Final     HDL   Date Value Ref Range Status   03/10/2023 42 40 mg/dl Final   10/20/2022 51 40 mg/dl Final     LDL Cholesterol Calculated   Date Value Ref Range Status   01/18/2022 163 (H) 0 - 99 mg/dL Final     LDL CALCULATED ([facility])   Date Value Ref Range Status   03/10/2023 143 (A) 0 - 130 mg/dl Final   10/20/2022 157 (A) 0 - 130 mg/dl Final     Triglycerides   Date Value Ref Range Status   03/10/2023 231 (A) 0 - 149 mg/dl Final   10/20/2022 204 (A) 0 - 149 mg/dl Final   01/18/2022 203 (H) 0 - 149 mg/dL Final     Med: omeprazole 20mg    LOV (related): 3/10/2023      Due for F/U around: overdue for cpx    Next Appt: 4/12/2024

## 2024-03-22 DIAGNOSIS — F32.0 CURRENT MILD EPISODE OF MAJOR DEPRESSIVE DISORDER WITHOUT PRIOR EPISODE (H): ICD-10-CM

## 2024-03-22 DIAGNOSIS — F41.9 ANXIETY: ICD-10-CM

## 2024-03-22 DIAGNOSIS — K21.9 GASTROESOPHAGEAL REFLUX DISEASE WITHOUT ESOPHAGITIS: ICD-10-CM

## 2024-03-22 DIAGNOSIS — E78.5 HYPERLIPIDEMIA, UNSPECIFIED HYPERLIPIDEMIA TYPE: ICD-10-CM

## 2024-03-22 RX ORDER — ESCITALOPRAM OXALATE 20 MG/1
20 TABLET ORAL DAILY
Qty: 90 TABLET | Refills: 3 | Status: SHIPPED | OUTPATIENT
Start: 2024-03-22

## 2024-03-22 NOTE — TELEPHONE ENCOUNTER
"Refill Omeprazole 20mg and Fenofibrate to [facility] in [place].    Med: Fenofibrate 160 mg and omeprazole 20 mg    LOV (related): 3/10/23    Last Lab: 3/10/23      Due for F/U around: for CPX    Next Appt: 4/12/24        Cholesterol   Date Value Ref Range Status   03/10/2023 231 (A) 100 - 199 mg/dl Final   10/20/2022 249 (A) 100 - 199 mg/dl Final   01/18/2022 250 (H) 100 - 199 mg/dL Final     HDL Cholesterol   Date Value Ref Range Status   01/18/2022 50 >39 mg/dL Final     HDL   Date Value Ref Range Status   03/10/2023 42 40 mg/dl Final   10/20/2022 51 40 mg/dl Final     LDL Cholesterol Calculated   Date Value Ref Range Status   01/18/2022 163 (H) 0 - 99 mg/dL Final     LDL CALCULATED ([facility])   Date Value Ref Range Status   03/10/2023 143 (A) 0 - 130 mg/dl Final   10/20/2022 157 (A) 0 - 130 mg/dl Final     Triglycerides   Date Value Ref Range Status   03/10/2023 231 (A) 0 - 149 mg/dl Final   10/20/2022 204 (A) 0 - 149 mg/dl Final   01/18/2022 203 (H) 0 - 149 mg/dL Final     No results found for: \"CHOLHDLRATIO\"     "

## 2024-03-22 NOTE — TELEPHONE ENCOUNTER
Med: Escitalopram       LOV (related): 3/10/23      Due for F/U around: 3/10/24 for cpx    Next Appt: 4/12/24 (CPX)  with Lori               1/18/2022    11:38 AM 10/20/2022     8:34 AM 3/10/2023    10:18 AM   PHQ   PHQ-9 Total Score 7 10 2   Q9: Thoughts of better off dead/self-harm past 2 weeks Not at all Not at all Not at all           1/18/2022    11:38 AM 10/20/2022     8:34 AM 3/10/2023    10:18 AM   ELMER-7 SCORE   Total Score 2 1 8

## 2024-03-23 RX ORDER — FENOFIBRATE 160 MG/1
160 TABLET ORAL DAILY
Qty: 90 TABLET | Refills: 0 | Status: SHIPPED | OUTPATIENT
Start: 2024-03-23 | End: 2024-06-18

## 2024-04-19 DIAGNOSIS — E78.5 HYPERLIPIDEMIA, UNSPECIFIED HYPERLIPIDEMIA TYPE: ICD-10-CM

## 2024-04-19 RX ORDER — ATORVASTATIN CALCIUM 80 MG/1
80 TABLET, FILM COATED ORAL DAILY
Qty: 90 TABLET | Refills: 0 | Status: SHIPPED | OUTPATIENT
Start: 2024-04-19 | End: 2024-06-10

## 2024-04-19 NOTE — TELEPHONE ENCOUNTER
"Med: atorvastatin 80 mg    LOV (related): 3/10/23 cpx    Last Lab: 3/10/23      Due for F/U around: over due     Next Appt: 6/7/24 with AM        Cholesterol   Date Value Ref Range Status   03/10/2023 231 (A) 100 - 199 mg/dl Final   10/20/2022 249 (A) 100 - 199 mg/dl Final   01/18/2022 250 (H) 100 - 199 mg/dL Final     HDL Cholesterol   Date Value Ref Range Status   01/18/2022 50 >39 mg/dL Final     HDL   Date Value Ref Range Status   03/10/2023 42 40 mg/dl Final   10/20/2022 51 40 mg/dl Final     LDL Cholesterol Calculated   Date Value Ref Range Status   01/18/2022 163 (H) 0 - 99 mg/dL Final     LDL CALCULATED ([facility])   Date Value Ref Range Status   03/10/2023 143 (A) 0 - 130 mg/dl Final   10/20/2022 157 (A) 0 - 130 mg/dl Final     Triglycerides   Date Value Ref Range Status   03/10/2023 231 (A) 0 - 149 mg/dl Final   10/20/2022 204 (A) 0 - 149 mg/dl Final   01/18/2022 203 (H) 0 - 149 mg/dL Final     No results found for: \"CHOLHDLRATIO\"    "

## 2024-05-12 ENCOUNTER — HEALTH MAINTENANCE LETTER (OUTPATIENT)
Age: 54
End: 2024-05-12

## 2024-06-06 SDOH — HEALTH STABILITY: PHYSICAL HEALTH: ON AVERAGE, HOW MANY MINUTES DO YOU ENGAGE IN EXERCISE AT THIS LEVEL?: 40 MIN

## 2024-06-06 SDOH — HEALTH STABILITY: PHYSICAL HEALTH: ON AVERAGE, HOW MANY DAYS PER WEEK DO YOU ENGAGE IN MODERATE TO STRENUOUS EXERCISE (LIKE A BRISK WALK)?: 2 DAYS

## 2024-06-06 ASSESSMENT — SOCIAL DETERMINANTS OF HEALTH (SDOH): HOW OFTEN DO YOU GET TOGETHER WITH FRIENDS OR RELATIVES?: ONCE A WEEK

## 2024-06-07 ENCOUNTER — OFFICE VISIT (OUTPATIENT)
Dept: FAMILY MEDICINE | Facility: CLINIC | Age: 54
End: 2024-06-07

## 2024-06-07 VITALS
OXYGEN SATURATION: 96 % | WEIGHT: 202 LBS | SYSTOLIC BLOOD PRESSURE: 144 MMHG | DIASTOLIC BLOOD PRESSURE: 86 MMHG | BODY MASS INDEX: 27.36 KG/M2 | HEART RATE: 59 BPM | HEIGHT: 72 IN

## 2024-06-07 DIAGNOSIS — Z23 NEED FOR VACCINATION FOR STREP PNEUMONIAE: ICD-10-CM

## 2024-06-07 DIAGNOSIS — J45.40 MODERATE PERSISTENT ASTHMA WITHOUT COMPLICATION: ICD-10-CM

## 2024-06-07 DIAGNOSIS — M25.562 LEFT ANTERIOR KNEE PAIN: ICD-10-CM

## 2024-06-07 DIAGNOSIS — R03.0 ELEVATED BP WITHOUT DIAGNOSIS OF HYPERTENSION: ICD-10-CM

## 2024-06-07 DIAGNOSIS — H93.13 TINNITUS, BILATERAL: ICD-10-CM

## 2024-06-07 DIAGNOSIS — R73.01 IMPAIRED FASTING GLUCOSE: ICD-10-CM

## 2024-06-07 DIAGNOSIS — G47.33 MILD OBSTRUCTIVE SLEEP APNEA: ICD-10-CM

## 2024-06-07 DIAGNOSIS — Z00.00 ROUTINE GENERAL MEDICAL EXAMINATION AT A HEALTH CARE FACILITY: Primary | ICD-10-CM

## 2024-06-07 DIAGNOSIS — E78.5 HYPERLIPIDEMIA, UNSPECIFIED HYPERLIPIDEMIA TYPE: ICD-10-CM

## 2024-06-07 DIAGNOSIS — Z23 NEED FOR SHINGLES VACCINE: ICD-10-CM

## 2024-06-07 DIAGNOSIS — F33.42 RECURRENT MAJOR DEPRESSIVE DISORDER, IN FULL REMISSION (H): ICD-10-CM

## 2024-06-07 DIAGNOSIS — I48.0 PAROXYSMAL ATRIAL FIBRILLATION (H): ICD-10-CM

## 2024-06-07 LAB
CHOLESTEROL: 226 MG/DL (ref 100–199)
FASTING?: YES
HBA1C MFR BLD: 5.8 %
HDL (RMG): 31 MG/DL (ref 40–?)
LDL CALCULATED (RMG): 159 MG/DL (ref 0–130)
TRIGLYCERIDES (RMG): 175 MG/DL (ref 0–149)

## 2024-06-07 PROCEDURE — 90750 HZV VACC RECOMBINANT IM: CPT | Performed by: FAMILY MEDICINE

## 2024-06-07 PROCEDURE — 99213 OFFICE O/P EST LOW 20 MIN: CPT | Mod: 25 | Performed by: FAMILY MEDICINE

## 2024-06-07 PROCEDURE — 83036 HEMOGLOBIN GLYCOSYLATED A1C: CPT | Performed by: FAMILY MEDICINE

## 2024-06-07 PROCEDURE — 90677 PCV20 VACCINE IM: CPT | Performed by: FAMILY MEDICINE

## 2024-06-07 PROCEDURE — 99396 PREV VISIT EST AGE 40-64: CPT | Mod: 25 | Performed by: FAMILY MEDICINE

## 2024-06-07 PROCEDURE — 80061 LIPID PANEL: CPT | Mod: QW | Performed by: FAMILY MEDICINE

## 2024-06-07 PROCEDURE — 36415 COLL VENOUS BLD VENIPUNCTURE: CPT | Performed by: FAMILY MEDICINE

## 2024-06-07 ASSESSMENT — ANXIETY QUESTIONNAIRES
GAD7 TOTAL SCORE: 4
GAD7 TOTAL SCORE: 4
3. WORRYING TOO MUCH ABOUT DIFFERENT THINGS: SEVERAL DAYS
2. NOT BEING ABLE TO STOP OR CONTROL WORRYING: NOT AT ALL
6. BECOMING EASILY ANNOYED OR IRRITABLE: SEVERAL DAYS
1. FEELING NERVOUS, ANXIOUS, OR ON EDGE: SEVERAL DAYS
7. FEELING AFRAID AS IF SOMETHING AWFUL MIGHT HAPPEN: NOT AT ALL
5. BEING SO RESTLESS THAT IT IS HARD TO SIT STILL: NOT AT ALL

## 2024-06-07 ASSESSMENT — ASTHMA QUESTIONNAIRES: ACT_TOTALSCORE: 25

## 2024-06-07 ASSESSMENT — PATIENT HEALTH QUESTIONNAIRE - PHQ9
5. POOR APPETITE OR OVEREATING: SEVERAL DAYS
SUM OF ALL RESPONSES TO PHQ QUESTIONS 1-9: 2

## 2024-06-07 NOTE — LETTER
My Asthma Action Plan    Name: Dayo Richards   YOB: 1970  Date: 6/7/2024   My doctor: Shazia Sandoval MD   My clinic: [facility]        My Rescue Medicine:   Albuterol inhaler (Proair/Ventolin/Proventil HFA)  2-4 puffs EVERY 4 HOURS as needed. Use a spacer if recommended by your provider.   My Asthma Severity:   Intermittent / Exercise Induced  Know your asthma triggers: Patient is unaware of triggers             GREEN ZONE   Good Control  I feel good  No cough or wheeze  Can work, sleep and play without asthma symptoms       Take your asthma control medicine every day.     If exercise triggers your asthma, take your rescue medication  15 minutes before exercise or sports, and  During exercise if you have asthma symptoms  Spacer to use with inhaler: If you have a spacer, make sure to use it with your inhaler             YELLOW ZONE Getting Worse  I have ANY of these:  I do not feel good  Cough or wheeze  Chest feels tight  Wake up at night   Keep taking your Green Zone medications  Start taking your rescue medicine:  every 20 minutes for up to 1 hour. Then every 4 hours for 24-48 hours.  If you stay in the Yellow Zone for more than 12-24 hours, contact your doctor.  If you do not return to the Green Zone in 12-24 hours or you get worse, start taking your oral steroid medicine if prescribed by your provider.           RED ZONE Medical Alert - Get Help  I have ANY of these:  I feel awful  Medicine is not helping  Breathing getting harder  Trouble walking or talking  Nose opens wide to breathe       Take your rescue medicine NOW  If your provider has prescribed an oral steroid medicine, start taking it NOW  Call your doctor NOW  If you are still in the Red Zone after 20 minutes and you have not reached your doctor:  Take your rescue medicine again and  Call 911 or go to the emergency room right away    See your regular doctor within 2 weeks of an Emergency Room or Urgent Care visit for  follow-up treatment.          Annual Reminders:  Meet with Asthma Educator,  Flu Shot in the Fall, consider Pneumonia Vaccination for patients with asthma (aged 19 and older).    Pharmacy:    [facility] [id number] [place] [facility] - [place] - [address] [place] [address] [id number] [place] - [address]  AT [facility] [address]    Electronically signed by Shazia Sandoval MD   Date: 06/07/24                    Asthma Triggers  How To Control Things That Make Your Asthma Worse    Triggers are things that make your asthma worse.  Look at the list below to help you find your triggers and   what you can do about them. You can help prevent asthma flare-ups by staying away from your triggers.      Trigger                                                          What you can do   Cigarette Smoke  Tobacco smoke can make asthma worse. Do not allow smoking in your home, car or around you.  Be sure no one smokes at a child s day care or school.  If you smoke, ask your health care provider for ways to help you quit.  Ask family members to quit too.  Ask your health care provider for a referral to Quit Plan to help you quit smoking, or call 4-429-261-PLAN.     Colds, Flu, Bronchitis  These are common triggers of asthma. Wash your hands often.  Don t touch your eyes, nose or mouth.  Get a flu shot every year.     Dust Mites  These are tiny bugs that live in cloth or carpet. They are too small to see. Wash sheets and blankets in hot water every week.   Encase pillows and mattress in dust mite proof covers.  Avoid having carpet if you can. If you have carpet, vacuum weekly.   Use a dust mask and HEPA vacuum.   Pollen and Outdoor Mold  Some people are allergic to trees, grass, or weed pollen, or molds. Try to keep your windows closed.  Limit time out doors when pollen count is high.   Ask you health care provider about taking medicine during allergy season.     Animal Dander  Some people are allergic to skin flakes, urine or  saliva from pets with fur or feathers. Keep pets with fur or feathers out of your home.    If you can t keep the pet outdoors, then keep the pet out of your bedroom.  Keep the bedroom door closed.  Keep pets off cloth furniture and away from stuffed toys.     Mice, Rats, and Cockroaches  Some people are allergic to the waste from these pests.   Cover food and garbage.  Clean up spills and food crumbs.  Store grease in the refrigerator.   Keep food out of the bedroom.   Indoor Mold  This can be a trigger if your home has high moisture. Fix leaking faucets, pipes, or other sources of water.   Clean moldy surfaces.  Dehumidify basement if it is damp and smelly.   Smoke, Strong Odors, and Sprays  These can reduce air quality. Stay away from strong odors and sprays, such as perfume, powder, hair spray, paints, smoke incense, paint, cleaning products, candles and new carpet.   Exercise or Sports  Some people with asthma have this trigger. Be active!  Ask your doctor about taking medicine before sports or exercise to prevent symptoms.    Warm up for 5-10 minutes before and after sports or exercise.     Other Triggers of Asthma  Cold air:  Cover your nose and mouth with a scarf.  Sometimes laughing or crying can be a trigger.  Some medicines and food can trigger asthma.

## 2024-06-07 NOTE — PATIENT INSTRUCTIONS
"Preventive Care Advice   This is general advice we often give to help people stay healthy. Your care team may have specific advice just for you. Please talk to your care team about your own preventive care needs.  Lifestyle  Exercise at least 150 minutes each week (30 minutes a day, 5 days a week).  Do muscle strengthening activities 2 days a week. These help control your weight and prevent disease.  No smoking.  Wear sunscreen to prevent skin cancer.  Have your home tested for radon every 2 to 5 years. Radon is a colorless, odorless gas that can harm your lungs. To learn more, go to www.health.[facility].mn. and search for \"Radon in Homes.\"  Keep guns unloaded and locked up in a safe place like a safe or gun vault, or, use a gun lock and hide the keys. Always lock away bullets separately. To learn more, visit [employer].[place].gov and search for \"safe gun storage.\"  Nutrition  Eat 5 or more servings of fruits and vegetables each day.  Try wheat bread, brown rice and whole grain pasta (instead of white bread, rice, and pasta).  Get enough calcium and vitamin D. Check the label on foods and aim for 100% of the RDA (recommended daily allowance).  Regular exams  Have a dental exam and cleaning every 6 months.  See your health care team every year to talk about:  Any changes in your health.  Any medicines your care team has prescribed.  Preventive care, family planning, and ways to prevent chronic diseases.  Shots (vaccines)   HPV shots (up to age 26), if you've never had them before.  Hepatitis B shots (up to age 59), if you've never had them before.  COVID-19 shot: Get this shot when it's due.  Flu shot: Get a flu shot every year.  Tetanus shot: Get a tetanus shot every 10 years.  Pneumococcal, hepatitis A, and RSV shots: Ask your care team if you need these based on your risk.  Shingles shot (for age 50 and up).  General health tests  Diabetes screening:  Starting at age 35, Get screened for diabetes at least every 3 years.  If " you are younger than age 35, ask your care team if you should be screened for diabetes.  Cholesterol test: At age 39, start having a cholesterol test every 5 years, or more often if advised.  Bone density scan (DEXA): At age 50, ask your care team if you should have this scan for osteoporosis (brittle bones).  Hepatitis C: Get tested at least once in your life.  Abdominal aortic aneurysm screening: Talk to your doctor about having this screening if you:  Have ever smoked; and  Are biologically male; and  Are between the ages of 65 and 75.  STIs (sexually transmitted infections)  Before age 24: Ask your care team if you should be screened for STIs.  After age 24: Get screened for STIs if you're at risk. You are at risk for STIs (including HIV) if:  You are sexually active with more than one person.  You don't use condoms every time.  You or a partner was diagnosed with a sexually transmitted infection.  If you are at risk for HIV, ask about PrEP medicine to prevent HIV.  Get tested for HIV at least once in your life, whether you are at risk for HIV or not.  Cancer screening tests  Cervical cancer screening: If you have a cervix, begin getting regular cervical cancer screening tests at age 21. Most people who have regular screenings with normal results can stop after age 65. Talk about this with your provider.  Breast cancer scan (mammogram): If you've ever had breasts, begin having regular mammograms starting at age 40. This is a scan to check for breast cancer.  Colon cancer screening: It is important to start screening for colon cancer at age 45.  Have a colonoscopy test every 10 years (or more often if you're at risk) Or, ask your provider about stool tests like a FIT test every year or Cologuard test every 3 years.  To learn more about your testing options, visit: www.UIEvolution/906685.pdf.  For help making a decision, visit: debbie/ym36236.  Prostate cancer screening test: If you have a prostate and are age 55  to 69, ask your provider if you would benefit from a yearly prostate cancer screening test.  Lung cancer screening: If you are a current or former smoker age 50 to 80, ask your care team if ongoing lung cancer screenings are right for you.  For informational purposes only. Not to replace the advice of your health care provider. Copyright   2023 [employer]. All rights reserved. Clinically reviewed by the [facility] Transitions Program. Stat 499928 - REV 04/24.    Preventing Falls: Care Instructions  Injuries and health problems such as trouble walking or poor eyesight can increase your risk of falling. So can some medicines. But there are things you can do to help prevent falls. You can exercise to get stronger. You can also arrange your home to make it safer.    Talk to your doctor about the medicines you take. Ask if any of them increase the risk of falls and whether they can be changed or stopped.   Try to exercise regularly. It can help improve your strength and balance. This can help lower your risk of falling.     Practice fall safety and prevention.    Wear low-heeled shoes that fit well and give your feet good support. Talk to your doctor if you have foot problems that make this hard.  Carry a cellphone or wear a medical alert device that you can use to call for help.  Use stepladders instead of chairs to reach high objects. Don't climb if you're at risk for falls. Ask for help, if needed.  Wear the correct eyeglasses, if you need them.    Make your home safer.    Remove rugs, cords, clutter, and furniture from walkways.  Keep your house well lit. Use night-lights in hallways and bathrooms.  Install and use sturdy handrails on stairways.  Wear nonskid footwear, even inside. Don't walk barefoot or in socks without shoes.    Be safe outside.    Use handrails, curb cuts, and ramps whenever possible.  Keep your hands free by using a shoulder bag or backpack.  Try to walk in well-lit  "areas. Watch out for uneven ground, changes in pavement, and debris.  Be careful in the winter. Walk on the grass or gravel when sidewalks are slippery. Use de-icer on steps and walkways. Add non-slip devices to shoes.    Put grab bars and nonskid mats in your shower or tub and near the toilet. Try to use a shower chair or bath bench when bathing.   Get into a tub or shower by putting in your weaker leg first. Get out with your strong side first. Have a phone or medical alert device in the bathroom with you.   Where can you learn more?  Go to https://www.[employer]armap[employer].net/patiented  Enter G117 in the search box to learn more about \"Preventing Falls: Care Instructions.\"  Current as of: July 17, 2023               Content Version: 14.0    5914-0818 [employer].   Care instructions adapted under license by your healthcare professional. If you have questions about a medical condition or this instruction, always ask your healthcare professional. [employer] disclaims any warranty or liability for your use of this information.      Substance Use Disorder: Care Instructions  Overview     You can improve your life and health by stopping your use of alcohol or drugs. When you don't drink or use drugs, you may feel and sleep better. You may get along better with your family, friends, and coworkers. There are medicines and programs that can help with substance use disorder.  How can you care for yourself at home?  Here are some ways to help you stay sober and prevent relapse.  If you have been given medicine to help keep you sober or reduce your cravings, be sure to take it exactly as prescribed.  Talk to your doctor about programs that can help you stop using drugs or drinking alcohol.  Do not keep alcohol or drugs in your home.  Plan ahead. Think about what you'll say if other people ask you to drink or use drugs. Try not to spend time with people who drink or use drugs.  Use the time and money spent " on drinking or drugs to do something that's important to you.  Preventing a relapse  Have a plan to deal with relapse. Learn to recognize changes in your thinking that lead you to drink or use drugs. Get help before you start to drink or use drugs again.  Try to stay away from situations, friends, or places that may lead you to drink or use drugs.  If you feel the need to drink alcohol or use drugs again, seek help right away. Call a trusted friend or family member. Some people get support from organizations such as Narcotics Anonymous or [employer] or from treatment facilities.  If you relapse, get help as soon as you can. Some people make a plan with another person that outlines what they want that person to do for them if they relapse. The plan usually includes how to handle the relapse and who to notify in case of relapse.  Don't give up. Remember that a relapse doesn't mean that you have failed. Use the experience to learn the triggers that lead you to drink or use drugs. Then quit again. Recovery is a lifelong process. Many people have several relapses before they are able to quit for good.  Follow-up care is a key part of your treatment and safety. Be sure to make and go to all appointments, and call your doctor if you are having problems. It's also a good idea to know your test results and keep a list of the medicines you take.  When should you call for help?   Call 911  anytime you think you may need emergency care. For example, call if you or someone else:    Has overdosed or has withdrawal signs. Be sure to tell the emergency workers that you are or someone else is using or trying to quit using drugs. Overdose or withdrawal signs may include:  Losing consciousness.  Seizure.  Seeing or hearing things that aren't there (hallucinations).     Is thinking or talking about suicide or harming others.   Where to get help 24 hours a day, 7 days a week   If you or someone you know talks about suicide,  "self-harm, a mental health crisis, a substance use crisis, or any other kind of emotional distress, get help right away. You can:    Call the Suicide and Crisis Lifeline at 988.     Call 3-246-520-JIMM (1-227.244.7889).     Text HOME to 876273 to access the Crisis Text Line.   Consider saving these numbers in your phone.  Go to [employer] for more information or to chat online.  Call your doctor now or seek immediate medical care if:    You are having withdrawal symptoms. These may include nausea or vomiting, sweating, shakiness, and anxiety.   Watch closely for changes in your health, and be sure to contact your doctor if:    You have a relapse.     You need more help or support to stop.   Where can you learn more?  Go to https://www.[employer].net/patiented  Enter H573 in the search box to learn more about \"Substance Use Disorder: Care Instructions.\"  Current as of: November 15, 2023               Content Version: 14.0    6259-4790 [employer].   Care instructions adapted under license by your healthcare professional. If you have questions about a medical condition or this instruction, always ask your healthcare professional. [employer] disclaims any warranty or liability for your use of this information.    Options for audiology (formal hearing testing)    [facility]- [phone] [address]   [address]   [place] [address]     [facility]- [phone] [address]   [address]  [place] [address]     [facility]- [phone]  [address]   [address]   [place] [address]       Aim to check your blood pressure at home 2-3 times per week for the next one month.     Then send those result to me via Fixationalhart or telephone call into the clinic with the readings.     Check out www.validatebp.org for a list of validated home blood pressure devices.            "

## 2024-06-07 NOTE — PROGRESS NOTES
Male Preventative Health Visit     SUBJECTIVE:    This 54 year old male presents for a routine preventive physical exam.    The patient has the following concerns:     -Persistent ringing bilateral ears. Last few months. Intermittent. 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 Is distractible with while noise. Denies any appreciable hearing loss. Audiology last tested 5 years ago.   -left knee pain. Top of knee cap in midline. Exacerbated by up and down stairs. Worse in last few months. H/o skiing, mountain biking, running. Some h/o 'cartilage issues' with left patella.     Patient's medications, allergies, past medical, surgical and family histories were reviewed and updated as appropriate.    Health Maintenance  Health maintenance alerts were reviewed and updated as appropriate.  Colorectal cancer screening: UTD on 5 year follow up           6/6/2024   General Health   How would you rate your overall physical health? Good   Feel stress (tense, anxious, or unable to sleep) To some extent   (!) STRESS CONCERN      6/6/2024   Nutrition   Three or more servings of calcium each day? Yes   Diet: Regular (no restrictions)   How many servings of fruit and vegetables per day? (!) 0-1   How many sweetened beverages each day? 0-1         6/6/2024   Exercise   Days per week of moderate/strenous exercise 2 days   Average minutes spent exercising at this level 40 min   (!) EXERCISE CONCERN      6/6/2024   Social Factors   Frequency of gathering with friends or relatives Once a week   Worry food won't last until get money to buy more No   Food not last or not have enough money for food? No   Do you have housing?  Yes   Are you worried about losing your housing? No   Lack of transportation? No   Unable to get utilities (heat,electricity)? No         6/6/2024   Fall Risk   Fallen 2 or more times in the past year? No   Trouble with walking or balance? Yes         6/6/2024   Dental   Dentist two times every year? Yes         6/6/2024  "  TB Screening   Were you born outside of the [place]? No           6/6/2024   Substance Use   Alcohol more than 3/day or more than 7/wk No   Do you use any other substances recreationally? (!) ALCOHOL    (!) CANNABIS PRODUCTS     Social History     Tobacco Use    Smoking status: Never    Smokeless tobacco: Current     Types: Chew         6/6/2024   STI Screening   New sexual partner(s) since last STI/HIV test? No         OBJECTIVE:    Vitals:    06/07/24 1031 06/07/24 1035   BP: (!) 146/88 (!) 144/86   Pulse: 59    SpO2: 96%    Weight: 91.6 kg (202 lb)    Height: 1.816 m (5' 11.5\")     Body mass index is 27.78 kg/m .  General: no acute distress, cooperative with exam.  HEENT:  PERRLA. Bilateral TM's, external canals, oropharynx normal.    Neck:  Supple, no lymphadenopathy or thyromegaly   Lungs: clear to auscultation bilaterally, normal respiratory effort.  Heart:  RRR without murmurs, rubs or gallops.  Normal S1 and S2.  Abdomen: normal bowel sounds, nontender, no palpable organomegaly.  Skin:  No lesions.  No rashes.  Focused exam of left knee:  Appearance: no swelling or visible deformity  Join line tenderness : absent  Effusion: none  Patellar crepitus: present  ROM: normal/Flexion is limited/Extension is limited  Special tests:    Lakhwinder: Negative    Lachman's: Negative    Valgus stress: Negative    Varus stress: Negative    Neuro:  CN II-XII intact, motor & sensory function all intact.    Psych: mental status normal, mood and affect appropriate.        1/18/2022    11:38 AM 10/20/2022     8:34 AM 3/10/2023    10:18 AM   PHQ   PHQ-9 Total Score 7 10 2   Q9: Thoughts of better off dead/self-harm past 2 weeks Not at all Not at all Not at all     BP Readings from Last 6 Encounters:   06/07/24 (!) 144/86   03/10/23 132/80   10/20/22 126/80   01/18/22 136/82       ASSESSMENT / PLAN: This 54 year old male presents for a routine preventive physical exam. Preventive health topics discussed including adequate exercise and " healthy diet. Return to clinic in one year for preventative exam or sooner with any other concerns.  Other issues discussed as noted below.        Routine general medical examination at a health care facility      Moderate persistent asthma without complication  Seasonal : uses ICS daily. Rare use of albuterol. Tiggers : pollen, seasonal allergies. ACT 25 today.   -     Asthma Action Plan (AAP)    Mild obstructive sleep apnea  Does not require CPAP or dental device.     Impaired fasting glucose  -     Hemoglobin A1c; Future    Hyperlipidemia, unspecified hyperlipidemia type  Atorvastatin and fenofibrate (started by preventative cardiologist who has since retired).  -     Lipid Profile ([facility])    Paroxysmal atrial fibrillation (H)  Detected after syncopal episode from [employer] with very low burden.  [id number] Score : 0. Not anticoagulated. No palpitations or syncopal / presyncopal episode. NSR on exam today.     Recurrent major depressive disorder, in full remission (H24)  Lexapro 20 mg daily. Working in health care information soft wear. Mood vastly improved since getting back into workforce.     Tinnitus, bilateral  -     Adult Audiology  Referral; Future    Need for shingles vaccine  -     ZOSTER RECOMBINANT ADJUVANTED (SHINGRIX)  -     IMMUNIATION ADMIN EACH ADDT'    Need for vaccination for Strep pneumoniae  -     PNEUMOCOCCAL 20 VALENT CONJUGATE (PREVNAR 20)  -     VACCINE ADMINISTRATION, INITIAL    Left anterior knee pain  -     Physical Therapy  Referral; Future    Elevated BP without diagnosis of hypertension   Discussed and issued on AVS:  Aim to check your blood pressure at home 2-3 times per week for the next one month.   Then send those result to me via Frequent Browserhart or telephone call into the clinic with the readings.

## 2024-06-10 DIAGNOSIS — E78.5 HYPERLIPIDEMIA, UNSPECIFIED HYPERLIPIDEMIA TYPE: ICD-10-CM

## 2024-06-10 RX ORDER — ATORVASTATIN CALCIUM 80 MG/1
80 TABLET, FILM COATED ORAL DAILY
Qty: 90 TABLET | Refills: 0 | Status: SHIPPED | OUTPATIENT
Start: 2024-06-10 | End: 2024-09-20

## 2024-06-10 NOTE — TELEPHONE ENCOUNTER
"Med: Atorvastatin     LOV (related): 6/7/24    Last Lab: 6/7/24      Due for F/U around: 6/13/2025    Next Appt: None        Cholesterol   Date Value Ref Range Status   06/07/2024 226 (A) 100 - 199 mg/dL Final   03/10/2023 231 (A) 100 - 199 mg/dl Final   01/18/2022 250 (H) 100 - 199 mg/dL Final     HDL Cholesterol   Date Value Ref Range Status   01/18/2022 50 >39 mg/dL Final     HDL   Date Value Ref Range Status   06/07/2024 31 (A) 40 mg/dL Final   03/10/2023 42 40 mg/dl Final     LDL Cholesterol Calculated   Date Value Ref Range Status   01/18/2022 163 (H) 0 - 99 mg/dL Final     LDL CALCULATED ([facility])   Date Value Ref Range Status   06/07/2024 159 (A) 0 - 130 mg/dL Final   03/10/2023 143 (A) 0 - 130 mg/dl Final     Triglycerides   Date Value Ref Range Status   06/07/2024 175 (A) 0 - 149 mg/dL Final   03/10/2023 231 (A) 0 - 149 mg/dl Final   01/18/2022 203 (H) 0 - 149 mg/dL Final     No results found for: \"CHOLHDLRATIO\"    "

## 2024-06-18 DIAGNOSIS — K21.9 GASTROESOPHAGEAL REFLUX DISEASE WITHOUT ESOPHAGITIS: ICD-10-CM

## 2024-06-18 DIAGNOSIS — E78.5 HYPERLIPIDEMIA, UNSPECIFIED HYPERLIPIDEMIA TYPE: ICD-10-CM

## 2024-06-18 RX ORDER — FENOFIBRATE 160 MG/1
160 TABLET ORAL DAILY
Qty: 90 TABLET | Refills: 0 | Status: SHIPPED | OUTPATIENT
Start: 2024-06-18 | End: 2024-09-20

## 2024-06-18 NOTE — TELEPHONE ENCOUNTER
"Med: Fenofibrate 160 mg   Omeprazole 20 mg     LOV (related): 6/7/24-cpx     Last Lab: 6/7/24      Due for F/U around: 1 year for CPX     Next Appt: No current future appointments scheduled         Cholesterol   Date Value Ref Range Status   06/07/2024 226 (A) 100 - 199 mg/dL Final   03/10/2023 231 (A) 100 - 199 mg/dl Final   01/18/2022 250 (H) 100 - 199 mg/dL Final     HDL Cholesterol   Date Value Ref Range Status   01/18/2022 50 >39 mg/dL Final     HDL   Date Value Ref Range Status   06/07/2024 31 (A) 40 mg/dL Final   03/10/2023 42 40 mg/dl Final     LDL Cholesterol Calculated   Date Value Ref Range Status   01/18/2022 163 (H) 0 - 99 mg/dL Final     LDL CALCULATED ([facility])   Date Value Ref Range Status   06/07/2024 159 (A) 0 - 130 mg/dL Final   03/10/2023 143 (A) 0 - 130 mg/dl Final     Triglycerides   Date Value Ref Range Status   06/07/2024 175 (A) 0 - 149 mg/dL Final   03/10/2023 231 (A) 0 - 149 mg/dl Final   01/18/2022 203 (H) 0 - 149 mg/dL Final     No results found for: \"CHOLHDLRATIO\"     "

## 2024-09-13 ENCOUNTER — OFFICE VISIT (OUTPATIENT)
Dept: FAMILY MEDICINE | Facility: CLINIC | Age: 54
End: 2024-09-13

## 2024-09-13 VITALS
HEART RATE: 59 BPM | OXYGEN SATURATION: 98 % | BODY MASS INDEX: 27.62 KG/M2 | DIASTOLIC BLOOD PRESSURE: 87 MMHG | SYSTOLIC BLOOD PRESSURE: 148 MMHG | WEIGHT: 200.8 LBS

## 2024-09-13 DIAGNOSIS — Z23 NEEDS FLU SHOT: ICD-10-CM

## 2024-09-13 DIAGNOSIS — I10 PRIMARY HYPERTENSION: Primary | ICD-10-CM

## 2024-09-13 DIAGNOSIS — I48.0 PAROXYSMAL ATRIAL FIBRILLATION (H): ICD-10-CM

## 2024-09-13 LAB
ANION GAP SERPL CALCULATED.3IONS-SCNC: 10 MMOL/L (ref 7–15)
BUN SERPL-MCNC: 11.1 MG/DL (ref 6–20)
CALCIUM SERPL-MCNC: 10 MG/DL (ref 8.8–10.4)
CHLORIDE SERPL-SCNC: 107 MMOL/L (ref 98–107)
CREAT SERPL-MCNC: 1.15 MG/DL (ref 0.67–1.17)
EGFRCR SERPLBLD CKD-EPI 2021: 77 ML/MIN/1.73M2
FASTING STATUS PATIENT QL REPORTED: NO
GLUCOSE SERPL-MCNC: 110 MG/DL (ref 70–99)
HCO3 SERPL-SCNC: 25 MMOL/L (ref 22–29)
POTASSIUM SERPL-SCNC: 4.1 MMOL/L (ref 3.4–5.3)
SODIUM SERPL-SCNC: 142 MMOL/L (ref 135–145)

## 2024-09-13 PROCEDURE — 99214 OFFICE O/P EST MOD 30 MIN: CPT | Mod: 25 | Performed by: FAMILY MEDICINE

## 2024-09-13 PROCEDURE — 90661 CCIIV3 VAC ABX FR 0.5 ML IM: CPT | Performed by: FAMILY MEDICINE

## 2024-09-13 PROCEDURE — 80048 BASIC METABOLIC PNL TOTAL CA: CPT | Performed by: FAMILY MEDICINE

## 2024-09-13 PROCEDURE — 36415 COLL VENOUS BLD VENIPUNCTURE: CPT | Performed by: FAMILY MEDICINE

## 2024-09-13 PROCEDURE — 80048 BASIC METABOLIC PNL TOTAL CA: CPT | Mod: 90 | Performed by: FAMILY MEDICINE

## 2024-09-13 PROCEDURE — 90471 IMMUNIZATION ADMIN: CPT | Performed by: FAMILY MEDICINE

## 2024-09-13 RX ORDER — IRBESARTAN 75 MG/1
75 TABLET ORAL AT BEDTIME
Qty: 90 TABLET | Refills: 3 | Status: SHIPPED | OUTPATIENT
Start: 2024-09-13

## 2024-09-13 NOTE — PROGRESS NOTES
SUBJECTIVE:    Dayo Richards, is a 54 year old male presenting for the below:     Elevated blood pressure readings in range of hypertension at home and in clinic.     OBJECTIVE:  Vitals:    09/13/24 1508 09/13/24 1509   BP: (!) 153/91 (!) 148/87   Pulse: 59    SpO2: 98%    Weight: 91.1 kg (200 lb 12.8 oz)     Body mass index is 27.62 kg/m .  General: no acute distress, cooperative with exam.    Psych: mental status normal, mood and affect appropriate.      ASSESSMENT / PLAN:      Primary hypertension  Start ARB. Mechanism of action, common side effects and how to take discussed.   Follow up 2 weeks blood pressure and BMP recheck.    -     irbesartan (AVAPRO) 75 MG tablet; Take 1 tablet (75 mg) by mouth at bedtime.  -     Basic metabolic panel; Future    Paroxysmal atrial fibrillation (H)  Detected after syncopal episode from [facility] with very low burden.  [id number] Score : 0. Not anticoagulated. No palpitations or syncopal / presyncopal episode. NSR on exam today.     Needs flu shot  -     INFLUENZA TRIVALENT VACCINE (FLUCELVAX)  -     VACCINE ADMINISTRATION, INITIAL    The longitudinal plan of care for the diagnosis(es)/condition(s) as documented were addressed during this visit. Due to the added complexity in care, I will continue to support Dayo in the subsequent management and with ongoing continuity of care.

## 2024-09-20 DIAGNOSIS — E78.5 HYPERLIPIDEMIA, UNSPECIFIED HYPERLIPIDEMIA TYPE: ICD-10-CM

## 2024-09-20 DIAGNOSIS — K21.9 GASTROESOPHAGEAL REFLUX DISEASE WITHOUT ESOPHAGITIS: ICD-10-CM

## 2024-09-20 NOTE — TELEPHONE ENCOUNTER
Med: atorvastatin, fenofibrate    LOV (related): 06/07/2024    Last Lab: 06/07/2024      Due for F/U around: 1yr    Next Appt: 09/27/2024        Cholesterol   Date Value Ref Range Status   06/07/2024 226 (A) 100 - 199 mg/dL Final   03/10/2023 231 (A) 100 - 199 mg/dl Final   01/18/2022 250 (H) 100 - 199 mg/dL Final     HDL Cholesterol   Date Value Ref Range Status   01/18/2022 50 >39 mg/dL Final     HDL   Date Value Ref Range Status   06/07/2024 31 (A) 40 mg/dL Final   03/10/2023 42 40 mg/dl Final     LDL Cholesterol Calculated   Date Value Ref Range Status   01/18/2022 163 (H) 0 - 99 mg/dL Final     LDL CALCULATED ([facility])   Date Value Ref Range Status   06/07/2024 159 (A) 0 - 130 mg/dL Final   03/10/2023 143 (A) 0 - 130 mg/dl Final     Triglycerides   Date Value Ref Range Status   06/07/2024 175 (A) 0 - 149 mg/dL Final   03/10/2023 231 (A) 0 - 149 mg/dl Final   01/18/2022 203 (H) 0 - 149 mg/dL Final     Med: omeprazole    LOV (related): 06/07/2024      Due for F/U around: 1yr    Next Appt: 09/27/2024

## 2024-09-23 RX ORDER — ATORVASTATIN CALCIUM 80 MG/1
80 TABLET, FILM COATED ORAL DAILY
Qty: 90 TABLET | Refills: 1 | Status: SHIPPED | OUTPATIENT
Start: 2024-09-23

## 2024-09-23 RX ORDER — FENOFIBRATE 160 MG/1
160 TABLET ORAL DAILY
Qty: 90 TABLET | Refills: 1 | Status: SHIPPED | OUTPATIENT
Start: 2024-09-23

## 2024-09-27 ENCOUNTER — OFFICE VISIT (OUTPATIENT)
Dept: FAMILY MEDICINE | Facility: CLINIC | Age: 54
End: 2024-09-27

## 2024-09-27 VITALS
WEIGHT: 201.8 LBS | OXYGEN SATURATION: 98 % | DIASTOLIC BLOOD PRESSURE: 87 MMHG | BODY MASS INDEX: 27.75 KG/M2 | SYSTOLIC BLOOD PRESSURE: 149 MMHG | HEART RATE: 60 BPM

## 2024-09-27 DIAGNOSIS — I10 PRIMARY HYPERTENSION: Primary | ICD-10-CM

## 2024-09-27 LAB
ANION GAP SERPL CALCULATED.3IONS-SCNC: 11 MMOL/L (ref 7–15)
BUN SERPL-MCNC: 9.7 MG/DL (ref 6–20)
CALCIUM SERPL-MCNC: 9.7 MG/DL (ref 8.8–10.4)
CHLORIDE SERPL-SCNC: 104 MMOL/L (ref 98–107)
CREAT SERPL-MCNC: 1.08 MG/DL (ref 0.67–1.17)
EGFRCR SERPLBLD CKD-EPI 2021: 83 ML/MIN/1.73M2
FASTING STATUS PATIENT QL REPORTED: NO
GLUCOSE SERPL-MCNC: 96 MG/DL (ref 70–99)
HCO3 SERPL-SCNC: 24 MMOL/L (ref 22–29)
POTASSIUM SERPL-SCNC: 4.3 MMOL/L (ref 3.4–5.3)
SODIUM SERPL-SCNC: 139 MMOL/L (ref 135–145)

## 2024-09-27 PROCEDURE — G2211 COMPLEX E/M VISIT ADD ON: HCPCS | Performed by: FAMILY MEDICINE

## 2024-09-27 PROCEDURE — 80048 BASIC METABOLIC PNL TOTAL CA: CPT | Performed by: FAMILY MEDICINE

## 2024-09-27 PROCEDURE — 36415 COLL VENOUS BLD VENIPUNCTURE: CPT | Performed by: FAMILY MEDICINE

## 2024-09-27 PROCEDURE — 99214 OFFICE O/P EST MOD 30 MIN: CPT | Performed by: FAMILY MEDICINE

## 2024-09-27 PROCEDURE — 80048 BASIC METABOLIC PNL TOTAL CA: CPT | Mod: 90 | Performed by: FAMILY MEDICINE

## 2024-09-27 RX ORDER — IRBESARTAN AND HYDROCHLOROTHIAZIDE 300; 12.5 MG/1; MG/1
1 TABLET, FILM COATED ORAL DAILY
Qty: 90 TABLET | Refills: 3 | Status: SHIPPED | OUTPATIENT
Start: 2024-09-27

## 2024-09-27 RX ORDER — IRBESARTAN AND HYDROCHLOROTHIAZIDE 150; 12.5 MG/1; MG/1
1 TABLET, FILM COATED ORAL DAILY
Status: CANCELLED | OUTPATIENT
Start: 2024-09-27

## 2024-09-27 NOTE — PROGRESS NOTES
SUBJECTIVE:    Dayo Richards, is a 54 year old male presenting for the below:     Hypertension : started ARB at last appointment. Home blood pressure reading in range seen at clinic today. Denies side effects.     OBJECTIVE:  Vitals:    09/27/24 1040   BP: (!) 149/87   Pulse: 60   SpO2: 98%   Weight: 91.5 kg (201 lb 12.8 oz)    Body mass index is 27.75 kg/m .  General: no acute distress, cooperative with exam.  Psych: mental status normal, mood and affect appropriate.    BP Readings from Last 6 Encounters:   09/27/24 (!) 149/87   09/13/24 (!) 148/87   06/07/24 (!) 144/86   03/10/23 132/80   10/20/22 126/80   01/18/22 136/82       ASSESSMENT / PLAN:      Primary hypertension  Increase ARB dose and add in hydrochlorothiazide in combination tab.   Mechanism of action, common side effects and how to take discussed.   -     Basic metabolic panel; Future  -     irbesartan-hydrochlorothiazide (AVALIDE) 300-12.5 MG tablet; Take 1 tablet by mouth daily.    The longitudinal plan of care for the diagnosis(es)/condition(s) as documented were addressed during this visit. Due to the added complexity in care, I will continue to support Dayo in the subsequent management and with ongoing continuity of care.    Follow up : 2 weeks blood pressure and BMP recheck.

## 2024-10-25 ENCOUNTER — OFFICE VISIT (OUTPATIENT)
Dept: FAMILY MEDICINE | Facility: CLINIC | Age: 54
End: 2024-10-25

## 2024-10-25 VITALS
DIASTOLIC BLOOD PRESSURE: 65 MMHG | BODY MASS INDEX: 27.84 KG/M2 | HEART RATE: 60 BPM | WEIGHT: 202.4 LBS | SYSTOLIC BLOOD PRESSURE: 131 MMHG | OXYGEN SATURATION: 99 %

## 2024-10-25 DIAGNOSIS — I10 PRIMARY HYPERTENSION: Primary | ICD-10-CM

## 2024-10-25 LAB
ANION GAP SERPL CALCULATED.3IONS-SCNC: 6 MMOL/L (ref 7–15)
BUN SERPL-MCNC: 13.2 MG/DL (ref 6–20)
CALCIUM SERPL-MCNC: 9.8 MG/DL (ref 8.8–10.4)
CHLORIDE SERPL-SCNC: 103 MMOL/L (ref 98–107)
CREAT SERPL-MCNC: 1.18 MG/DL (ref 0.67–1.17)
EGFRCR SERPLBLD CKD-EPI 2021: 74 ML/MIN/1.73M2
FASTING STATUS PATIENT QL REPORTED: NO
GLUCOSE SERPL-MCNC: 98 MG/DL (ref 70–99)
HCO3 SERPL-SCNC: 30 MMOL/L (ref 22–29)
POTASSIUM SERPL-SCNC: 4.2 MMOL/L (ref 3.4–5.3)
SODIUM SERPL-SCNC: 139 MMOL/L (ref 135–145)

## 2024-10-25 PROCEDURE — 36415 COLL VENOUS BLD VENIPUNCTURE: CPT | Performed by: FAMILY MEDICINE

## 2024-10-25 PROCEDURE — 99213 OFFICE O/P EST LOW 20 MIN: CPT | Performed by: FAMILY MEDICINE

## 2024-10-25 PROCEDURE — 80048 BASIC METABOLIC PNL TOTAL CA: CPT | Performed by: FAMILY MEDICINE

## 2024-10-25 PROCEDURE — 80048 BASIC METABOLIC PNL TOTAL CA: CPT | Mod: 90 | Performed by: FAMILY MEDICINE

## 2024-10-25 PROCEDURE — G2211 COMPLEX E/M VISIT ADD ON: HCPCS | Performed by: FAMILY MEDICINE

## 2024-10-25 NOTE — PROGRESS NOTES
SUBJECTIVE:    Dayo Richards, is a 54 year old male presenting for the below:     Hypertension  irbesartan-hydrochlorothiazide (AVALIDE) 300-12.5 MG tablet; Take 1 tablet by mouth daily. No side effects.     OBJECTIVE:  Vitals:    10/25/24 1613   BP: 131/65   Pulse: 60   SpO2: 99%   Weight: 91.8 kg (202 lb 6.4 oz)      Body mass index is 27.84 kg/m .  General: no acute distress, cooperative with exam.  Psych: mental status normal, mood and affect appropriate.    BP Readings from Last 6 Encounters:   10/25/24 131/65   09/27/24 (!) 149/87   09/13/24 (!) 148/87   06/07/24 (!) 144/86   03/10/23 132/80   10/20/22 126/80       ASSESSMENT / PLAN:      Primary hypertension  Continue irbesartan-hydrochlorothiazide (AVALIDE) 300-12.5 MG tablet; Take 1 tablet by mouth daily  at target of <140/90.  -     Basic metabolic panel; Future    The longitudinal plan of care for the diagnosis(es)/condition(s) as documented were addressed during this visit. Due to the added complexity in care, I will continue to support Dayo in the subsequent management and with ongoing continuity of care.    Follow up : 6 months blood pressure check.

## 2024-11-14 ENCOUNTER — THERAPY VISIT (OUTPATIENT)
Dept: PHYSICAL THERAPY | Facility: CLINIC | Age: 54
End: 2024-11-14
Attending: FAMILY MEDICINE
Payer: COMMERCIAL

## 2024-11-14 DIAGNOSIS — M25.562 LEFT ANTERIOR KNEE PAIN: ICD-10-CM

## 2024-11-14 ASSESSMENT — ACTIVITIES OF DAILY LIVING (ADL)
STIFFNESS: I DO NOT HAVE THE SYMPTOM
SQUAT: ACTIVITY IS VERY DIFFICULT
HOW_WOULD_YOU_RATE_THE_OVERALL_FUNCTION_OF_YOUR_KNEE_DURING_YOUR_USUAL_DAILY_ACTIVITIES?: ABNORMAL
PLEASE_INDICATE_YOR_PRIMARY_REASON_FOR_REFERRAL_TO_THERAPY:: KNEE
STIFFNESS: I DO NOT HAVE THE SYMPTOM
WEAKNESS: THE SYMPTOM AFFECTS MY ACTIVITY SEVERELY
LIMPING: I DO NOT HAVE THE SYMPTOM
AS_A_RESULT_OF_YOUR_KNEE_INJURY,_HOW_WOULD_YOU_RATE_YOUR_CURRENT_LEVEL_OF_DAILY_ACTIVITY?: ABNORMAL
GIVING WAY, BUCKLING OR SHIFTING OF KNEE: I DO NOT HAVE THE SYMPTOM
HOW_WOULD_YOU_RATE_THE_OVERALL_FUNCTION_OF_YOUR_KNEE_DURING_YOUR_USUAL_DAILY_ACTIVITIES?: ABNORMAL
PAIN: THE SYMPTOM AFFECTS MY ACTIVITY SEVERELY
GO UP STAIRS: ACTIVITY IS VERY DIFFICULT
SWELLING: I DO NOT HAVE THE SYMPTOM
WALK: ACTIVITY IS FAIRLY DIFFICULT
HOW_WOULD_YOU_RATE_THE_CURRENT_FUNCTION_OF_YOUR_KNEE_DURING_YOUR_USUAL_DAILY_ACTIVITIES_ON_A_SCALE_FROM_0_TO_100_WITH_100_BEING_YOUR_LEVEL_OF_KNEE_FUNCTION_PRIOR_TO_YOUR_INJURY_AND_0_BEING_THE_INABILITY_TO_PERFORM_ANY_OF_YOUR_USUAL_DAILY_ACTIVITIES?: 25
AS_A_RESULT_OF_YOUR_KNEE_INJURY,_HOW_WOULD_YOU_RATE_YOUR_CURRENT_LEVEL_OF_DAILY_ACTIVITY?: ABNORMAL
WALK: ACTIVITY IS FAIRLY DIFFICULT
STAND: ACTIVITY IS FAIRLY DIFFICULT
WEAKNESS: THE SYMPTOM AFFECTS MY ACTIVITY SEVERELY
SQUAT: ACTIVITY IS VERY DIFFICULT
PAIN: THE SYMPTOM AFFECTS MY ACTIVITY SEVERELY
RISE FROM A CHAIR: ACTIVITY IS SOMEWHAT DIFFICULT
GIVING WAY, BUCKLING OR SHIFTING OF KNEE: I DO NOT HAVE THE SYMPTOM
KNEEL ON THE FRONT OF YOUR KNEE: ACTIVITY IS FAIRLY DIFFICULT
GO DOWN STAIRS: ACTIVITY IS VERY DIFFICULT
SIT WITH YOUR KNEE BENT: ACTIVITY IS NOT DIFFICULT
STAND: ACTIVITY IS FAIRLY DIFFICULT
KNEE_ACTIVITY_OF_DAILY_LIVING_SUM: 39
KNEEL ON THE FRONT OF YOUR KNEE: ACTIVITY IS FAIRLY DIFFICULT
SWELLING: I DO NOT HAVE THE SYMPTOM
SIT WITH YOUR KNEE BENT: ACTIVITY IS NOT DIFFICULT
RAW_SCORE: 39
KNEE_ACTIVITY_OF_DAILY_LIVING_SCORE: 55.71
GO UP STAIRS: ACTIVITY IS VERY DIFFICULT
GO DOWN STAIRS: ACTIVITY IS VERY DIFFICULT
LIMPING: I DO NOT HAVE THE SYMPTOM
HOW_WOULD_YOU_RATE_THE_CURRENT_FUNCTION_OF_YOUR_KNEE_DURING_YOUR_USUAL_DAILY_ACTIVITIES_ON_A_SCALE_FROM_0_TO_100_WITH_100_BEING_YOUR_LEVEL_OF_KNEE_FUNCTION_PRIOR_TO_YOUR_INJURY_AND_0_BEING_THE_INABILITY_TO_PERFORM_ANY_OF_YOUR_USUAL_DAILY_ACTIVITIES?: 25
RISE FROM A CHAIR: ACTIVITY IS SOMEWHAT DIFFICULT

## 2024-11-14 NOTE — PROGRESS NOTES
PHYSICAL THERAPY EVALUATION  Type of Visit: Evaluation              Subjective I have had knee for a long time.  Pain comes and goes.  This time it has been coming more often.  I get pain in the front and behind the knee cap when I do stairs, get out of bed, squat and get out of a chair.  I was previously very active with running and skiing.         Presenting condition or subjective complaint: (Patient-Rptd) knee pain  Date of onset: 24 (MD appointment)    Relevant medical history:   previous knee issues  Dates & types of surgery: (Patient-Rptd)  broken finger    Prior diagnostic imaging/testing results:       Prior therapy history for the same diagnosis, illness or injury: (Patient-Rptd) Yes (Patient-Rptd) pt       Living Environment  Social support: (Patient-Rptd) With a significant other or spouse   Type of home: (Patient-Rptd) House   Stairs to enter the home: (Patient-Rptd) No       Ramp: (Patient-Rptd) No   Stairs inside the home: (Patient-Rptd) Yes (Patient-Rptd) 14 Is there a railing: (Patient-Rptd) Yes     Help at home: (Patient-Rptd) None  Equipment owned:       Employment: (Patient-Rptd) Yes (Patient-Rptd)   Hobbies/Interests: (Patient-Rptd) music, golf, reading    Patient goals for therapy: (Patient-Rptd) walk up and down stairs pain free    Pain assessment: Location: anterior left knee/Ratin-7/10     Objective   KNEE EVALUATION  PAIN: Pain Level at Rest: 0/10  Pain Level with Use: 6/10  Pain Location: knee  Pain Quality: Sharp, Shooting, and Stabbing  Pain is Exacerbated By: stairs up and down, getting out of chair, out of bed, squatting   INTEGUMENTARY (edema, incisions): WNL  POSTURE: Standing Posture: Rounded shoulders, Forward head, left patella pointing outward  GAIT:  Weightbearing Status: WBAT  Assistive Device(s): None  Gait Deviations:  decrease in DF with stance   BALANCE/PROPRIOCEPTION: WNL  NON-WEIGHTBEARING ALIGNMENT:  left patella lateral, tilted, right  lateral   ROM: AROM WFL  PROM WFL    STRENGTH: WNL, hip abduction WFL, decrease in left VMO activation   FLEXIBILITY:  hip flexors, quadriceps, hamstring, ITB, calf     FUNCTIONAL TESTS: Double Leg Squat: Anterior knee translation, Knee valgus, Hip internal rotation, and Improper use of glutes/hips  PALPATION:  lateral patella left greater than right   JOINT MOBILITY:  decrease movement medially left greater than right.     Assessment & Plan   CLINICAL IMPRESSIONS  Medical Diagnosis: left knee pain, possible PF syndrome    Treatment Diagnosis: left knee pain, possible PF syndrome   Impression/Assessment: Patient is a 54 year old male with left anterior knee pain  complaints.  The following significant findings have been identified: Pain, Decreased ROM/flexibility, Decreased joint mobility, Edema, and Impaired muscle performance. These impairments interfere with their ability to perform self care tasks, recreational activities, household chores, and driving  as compared to previous level of function.     Clinical Decision Making (Complexity):  Clinical Presentation: Stable/Uncomplicated  Clinical Presentation Rationale: based on medical and personal factors listed in PT evaluation  Clinical Decision Making (Complexity): Low complexity    PLAN OF CARE  Treatment Interventions:  Interventions: Manual Therapy, Neuromuscular Re-education, Therapeutic Activity, Therapeutic Exercise    Long Term Goals     PT Goal 1  Goal Identifier: up and down stairs  Goal Description: able to go up and down 12 steps without pain  Rationale: to maximize safety and independence with performance of ADLs and functional tasks;to maximize safety and independence within the home;to maximize safety and independence within the community;to maximize safety and independence with self cares  Goal Progress: going up and down 6 steps pain 4-5/10  Target Date: 02/06/25      Frequency of Treatment: 1x/ week every other  week  Duration of Treatment: 12  weeks    Recommended Referrals to Other Professionals:  none  Education Assessment:   Learner/Method: Patient;Demonstration;Pictures/Video;No Barriers to Learning    Risks and benefits of evaluation/treatment have been explained.   Patient/Family/caregiver agrees with Plan of Care.     Evaluation Time:     PT Eval, Low Complexity Minutes (85260): 15       Signing Clinician: Erinn Hoffmann PT

## 2024-12-05 ENCOUNTER — THERAPY VISIT (OUTPATIENT)
Dept: PHYSICAL THERAPY | Facility: CLINIC | Age: 54
End: 2024-12-05
Payer: COMMERCIAL

## 2024-12-05 DIAGNOSIS — M25.562 LEFT ANTERIOR KNEE PAIN: Primary | ICD-10-CM

## 2025-02-20 PROBLEM — M25.562 LEFT ANTERIOR KNEE PAIN: Status: RESOLVED | Noted: 2024-11-14 | Resolved: 2025-02-20

## 2025-03-29 DIAGNOSIS — F41.9 ANXIETY: ICD-10-CM

## 2025-03-29 DIAGNOSIS — E78.5 HYPERLIPIDEMIA, UNSPECIFIED HYPERLIPIDEMIA TYPE: ICD-10-CM

## 2025-03-29 DIAGNOSIS — F32.0 CURRENT MILD EPISODE OF MAJOR DEPRESSIVE DISORDER WITHOUT PRIOR EPISODE: ICD-10-CM

## 2025-03-29 DIAGNOSIS — K21.9 GASTROESOPHAGEAL REFLUX DISEASE WITHOUT ESOPHAGITIS: ICD-10-CM

## 2025-03-29 NOTE — CONFIDENTIAL NOTE
"Med: FENOFIBRATE, OMEPRAZOLE, ATORVASTATIN    LOV (related): 10/25/24 - BP    Last Lab: 10/25/24      Due for F/U around: 4/2025 FOR BP RECHECK - 6/2025 FOR CPX    Next Appt: NONE        BP Readings from Last 3 Encounters:   10/25/24 131/65   09/27/24 (!) 149/87   09/13/24 (!) 148/87       Last Comprehensive Metabolic Panel:  Lab Results   Component Value Date     10/25/2024    POTASSIUM 4.2 10/25/2024    CHLORIDE 103 10/25/2024    CO2 30 (H) 10/25/2024    ANIONGAP 6 (L) 10/25/2024    GLC 98 10/25/2024    BUN 13.2 10/25/2024    CR 1.18 (H) 10/25/2024    GFRESTIMATED 74 10/25/2024    MADI 9.8 10/25/2024         Lab Results   Component Value Date    CHOL 226 06/07/2024    CHOL 231 03/10/2023    CHOL 250 01/18/2022     Lab Results   Component Value Date    HDL 31 06/07/2024    HDL 42 03/10/2023    HDL 50 01/18/2022     Lab Results   Component Value Date     06/07/2024     03/10/2023     01/18/2022     Lab Results   Component Value Date    TRIG 175 06/07/2024    TRIG 231 03/10/2023    TRIG 203 01/18/2022     No results found for: \"CHOLHDLRATIO\"    "

## 2025-03-30 RX ORDER — FENOFIBRATE 160 MG/1
160 TABLET ORAL DAILY
Qty: 90 TABLET | Refills: 0 | Status: SHIPPED | OUTPATIENT
Start: 2025-03-30

## 2025-03-30 RX ORDER — ATORVASTATIN CALCIUM 80 MG/1
80 TABLET, FILM COATED ORAL DAILY
Qty: 90 TABLET | Refills: 0 | Status: SHIPPED | OUTPATIENT
Start: 2025-03-30

## 2025-03-30 RX ORDER — OMEPRAZOLE 20 MG/1
20 CAPSULE, DELAYED RELEASE ORAL DAILY
Qty: 90 CAPSULE | Refills: 0 | Status: SHIPPED | OUTPATIENT
Start: 2025-03-30

## 2025-07-07 DIAGNOSIS — E78.5 HYPERLIPIDEMIA, UNSPECIFIED HYPERLIPIDEMIA TYPE: ICD-10-CM

## 2025-07-07 DIAGNOSIS — F32.0 CURRENT MILD EPISODE OF MAJOR DEPRESSIVE DISORDER WITHOUT PRIOR EPISODE: ICD-10-CM

## 2025-07-07 DIAGNOSIS — K21.9 GASTROESOPHAGEAL REFLUX DISEASE WITHOUT ESOPHAGITIS: ICD-10-CM

## 2025-07-07 DIAGNOSIS — F41.9 ANXIETY: ICD-10-CM

## 2025-07-07 NOTE — TELEPHONE ENCOUNTER
"Refill Atorvastatin 80mg, Escitalopram 20, Fenofibrate 160mg and Omeprazole 20mg to [facility] on [address] in [place].    Med: atorvastatin (LIPITOR) 80 MG , Fenofibrate 160 mg, Omeprazole 20 mg, Escitalopram 20 mg    LOV (related): 6/7/2024 cpx    Last Lab: 6/7/2024      Due for F/U around: due    Next Appt: none  Left message to call back [facility]- at least a med ck appt  July 7, 2025  Dee Villeda MA          10/20/2022     8:34 AM 3/10/2023    10:18 AM 6/7/2024    11:14 AM   PHQ   PHQ-9 Total Score 10 2 2   Q9: Thoughts of better off dead/self-harm past 2 weeks Not at all Not at all Not at all       Cholesterol   Date Value Ref Range Status   06/07/2024 226 (A) 100 - 199 mg/dL Final   03/10/2023 231 (A) 100 - 199 mg/dl Final   01/18/2022 250 (H) 100 - 199 mg/dL Final     HDL Cholesterol   Date Value Ref Range Status   01/18/2022 50 >39 mg/dL Final     HDL   Date Value Ref Range Status   06/07/2024 31 (A) >=40 mg/dL Final   03/10/2023 42 >=40 mg/dl Final     LDL Cholesterol Calculated   Date Value Ref Range Status   01/18/2022 163 (H) 0 - 99 mg/dL Final     LDL CALCULATED ([facility])   Date Value Ref Range Status   06/07/2024 159 (A) 0 - 130 mg/dL Final   03/10/2023 143 (A) 0 - 130 mg/dl Final     Triglycerides   Date Value Ref Range Status   06/07/2024 175 (A) 0 - 149 mg/dL Final   03/10/2023 231 (A) 0 - 149 mg/dl Final   01/18/2022 203 (H) 0 - 149 mg/dL Final     No results found for: \"CHOLHDLRATIO\"     "

## 2025-07-08 RX ORDER — OMEPRAZOLE 20 MG/1
20 CAPSULE, DELAYED RELEASE ORAL DAILY
Qty: 90 CAPSULE | Refills: 0 | Status: SHIPPED | OUTPATIENT
Start: 2025-07-08 | End: 2025-07-10

## 2025-07-08 RX ORDER — ESCITALOPRAM OXALATE 20 MG/1
20 TABLET ORAL DAILY
Qty: 90 TABLET | Refills: 0 | Status: SHIPPED | OUTPATIENT
Start: 2025-07-08 | End: 2025-07-10

## 2025-07-08 RX ORDER — FENOFIBRATE 160 MG/1
160 TABLET ORAL DAILY
Qty: 90 TABLET | Refills: 0 | Status: SHIPPED | OUTPATIENT
Start: 2025-07-08 | End: 2025-07-10

## 2025-07-08 RX ORDER — ATORVASTATIN CALCIUM 80 MG/1
80 TABLET, FILM COATED ORAL DAILY
Qty: 90 TABLET | Refills: 0 | Status: SHIPPED | OUTPATIENT
Start: 2025-07-08 | End: 2025-07-10

## 2025-07-09 ASSESSMENT — PATIENT HEALTH QUESTIONNAIRE - PHQ9
SUM OF ALL RESPONSES TO PHQ QUESTIONS 1-9: 3
SUM OF ALL RESPONSES TO PHQ QUESTIONS 1-9: 3
10. IF YOU CHECKED OFF ANY PROBLEMS, HOW DIFFICULT HAVE THESE PROBLEMS MADE IT FOR YOU TO DO YOUR WORK, TAKE CARE OF THINGS AT HOME, OR GET ALONG WITH OTHER PEOPLE: NOT DIFFICULT AT ALL

## 2025-07-09 ASSESSMENT — ANXIETY QUESTIONNAIRES
8. IF YOU CHECKED OFF ANY PROBLEMS, HOW DIFFICULT HAVE THESE MADE IT FOR YOU TO DO YOUR WORK, TAKE CARE OF THINGS AT HOME, OR GET ALONG WITH OTHER PEOPLE?: NOT DIFFICULT AT ALL
GAD7 TOTAL SCORE: 6
1. FEELING NERVOUS, ANXIOUS, OR ON EDGE: SEVERAL DAYS
6. BECOMING EASILY ANNOYED OR IRRITABLE: SEVERAL DAYS
GAD7 TOTAL SCORE: 6
7. FEELING AFRAID AS IF SOMETHING AWFUL MIGHT HAPPEN: SEVERAL DAYS
2. NOT BEING ABLE TO STOP OR CONTROL WORRYING: SEVERAL DAYS
3. WORRYING TOO MUCH ABOUT DIFFERENT THINGS: SEVERAL DAYS
GAD7 TOTAL SCORE: 6
7. FEELING AFRAID AS IF SOMETHING AWFUL MIGHT HAPPEN: SEVERAL DAYS
4. TROUBLE RELAXING: SEVERAL DAYS
5. BEING SO RESTLESS THAT IT IS HARD TO SIT STILL: NOT AT ALL
IF YOU CHECKED OFF ANY PROBLEMS ON THIS QUESTIONNAIRE, HOW DIFFICULT HAVE THESE PROBLEMS MADE IT FOR YOU TO DO YOUR WORK, TAKE CARE OF THINGS AT HOME, OR GET ALONG WITH OTHER PEOPLE: NOT DIFFICULT AT ALL

## 2025-07-09 ASSESSMENT — ASTHMA QUESTIONNAIRES
ACT_TOTALSCORE: 25
QUESTION_5 LAST FOUR WEEKS HOW WOULD YOU RATE YOUR ASTHMA CONTROL: COMPLETELY CONTROLLED
QUESTION_3 LAST FOUR WEEKS HOW OFTEN DID YOUR ASTHMA SYMPTOMS (WHEEZING, COUGHING, SHORTNESS OF BREATH, CHEST TIGHTNESS OR PAIN) WAKE YOU UP AT NIGHT OR EARLIER THAN USUAL IN THE MORNING: NOT AT ALL
QUESTION_2 LAST FOUR WEEKS HOW OFTEN HAVE YOU HAD SHORTNESS OF BREATH: NOT AT ALL
QUESTION_4 LAST FOUR WEEKS HOW OFTEN HAVE YOU USED YOUR RESCUE INHALER OR NEBULIZER MEDICATION (SUCH AS ALBUTEROL): NOT AT ALL
QUESTION_1 LAST FOUR WEEKS HOW MUCH OF THE TIME DID YOUR ASTHMA KEEP YOU FROM GETTING AS MUCH DONE AT WORK, SCHOOL OR AT HOME: NONE OF THE TIME

## 2025-07-10 ENCOUNTER — OFFICE VISIT (OUTPATIENT)
Dept: FAMILY MEDICINE | Facility: CLINIC | Age: 55
End: 2025-07-10

## 2025-07-10 VITALS
HEIGHT: 72 IN | DIASTOLIC BLOOD PRESSURE: 87 MMHG | BODY MASS INDEX: 28.71 KG/M2 | HEART RATE: 57 BPM | SYSTOLIC BLOOD PRESSURE: 157 MMHG | WEIGHT: 212 LBS | OXYGEN SATURATION: 100 %

## 2025-07-10 DIAGNOSIS — G47.33 MILD OBSTRUCTIVE SLEEP APNEA: ICD-10-CM

## 2025-07-10 DIAGNOSIS — I10 PRIMARY HYPERTENSION: ICD-10-CM

## 2025-07-10 DIAGNOSIS — I48.0 PAROXYSMAL ATRIAL FIBRILLATION (H): ICD-10-CM

## 2025-07-10 DIAGNOSIS — K21.9 GASTROESOPHAGEAL REFLUX DISEASE WITHOUT ESOPHAGITIS: ICD-10-CM

## 2025-07-10 DIAGNOSIS — F41.1 GAD (GENERALIZED ANXIETY DISORDER): ICD-10-CM
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
## 2025-07-10 DIAGNOSIS — F32.0 CURRENT MILD EPISODE OF MAJOR DEPRESSIVE DISORDER WITHOUT PRIOR EPISODE: ICD-10-CM

## 2025-07-10 DIAGNOSIS — E78.5 HYPERLIPIDEMIA, UNSPECIFIED HYPERLIPIDEMIA TYPE: ICD-10-CM

## 2025-07-10 RX ORDER — IRBESARTAN AND HYDROCHLOROTHIAZIDE 300; 12.5 MG/1; MG/1
1 TABLET, FILM COATED ORAL DAILY
Qty: 90 TABLET | Refills: 3 | Status: SHIPPED | OUTPATIENT
Start: 2025-07-10

## 2025-07-10 RX ORDER — OMEPRAZOLE 20 MG/1
20 CAPSULE, DELAYED RELEASE ORAL DAILY
Qty: 90 CAPSULE | Refills: 3 | Status: SHIPPED | OUTPATIENT
Start: 2025-07-10

## 2025-07-10 RX ORDER — ESCITALOPRAM OXALATE 20 MG/1
20 TABLET ORAL DAILY
Qty: 90 TABLET | Refills: 3 | Status: SHIPPED | OUTPATIENT
Start: 2025-07-10

## 2025-07-10 RX ORDER — ATORVASTATIN CALCIUM 80 MG/1
80 TABLET, FILM COATED ORAL DAILY
Qty: 90 TABLET | Refills: 3 | Status: SHIPPED | OUTPATIENT
Start: 2025-07-10

## 2025-07-10 RX ORDER — FENOFIBRATE 160 MG/1
160 TABLET ORAL DAILY
Qty: 90 TABLET | Refills: 3 | Status: SHIPPED | OUTPATIENT
Start: 2025-07-10

## 2025-07-10 NOTE — PROGRESS NOTES
"SUBJECTIVE:    Dayo Richards, is a 54 year old male presenting for the below:     -Bug bite to left foot. Becoming more red in last 24-48 hours.     OBJECTIVE:  Vitals:    07/10/25 0920 07/10/25 0921   BP: (!) 153/91 (!) 157/87   Pulse: 57    SpO2: 100%    Weight: 96.2 kg (212 lb)    Height:  1.816 m (5' 11.5\")      Body mass index is 29.16 kg/m .  General: no acute distress, cooperative with exam.  Psych: mental status normal, mood and affect appropriate.  Skin : excoriated area to top of foot. With slight erythema surrounding. No heat or induration.         3/10/2023    10:18 AM 6/7/2024    11:14 AM 7/9/2025     9:22 AM   PHQ   PHQ-9 Total Score 2 2 3    Q9: Thoughts of better off dead/self-harm past 2 weeks Not at all Not at all Not at all       Patient-reported         3/10/2023    10:18 AM 6/7/2024    11:14 AM 7/9/2025     9:25 AM   ELMER-7 SCORE   Total Score   6 (mild anxiety)   Total Score 8 4 6        Patient-reported             BP Readings from Last 6 Encounters:   07/10/25 (!) 157/87   10/25/24 131/65   09/27/24 (!) 149/87   09/13/24 (!) 148/87   06/07/24 (!) 144/86   03/10/23 132/80       ASSESSMENT / PLAN:     Bug bite, sequela  Appearance most in keeping with localized inflam response at present with excoriation of bite area from scratching. Issued to [facility] delayed script to start if heat, erythema, induration progress within next 24 to 48 hours.   -     amoxicillin-clavulanate (AUGMENTIN) 875-125 MG tablet; Take 1 tablet by mouth 2 times daily for 7 days.    ELMER (generalized anxiety disorder)  Current mild episode of major depressive disorder without prior episode   Mood stable (despite some family situational stressors : wife with anxiety) New job. Enjoys work. Great boss and team.   -     escitalopram (LEXAPRO) 20 MG tablet; Take 1 tablet (20 mg) by mouth daily.    Gastroesophageal reflux disease without esophagitis  Stable.   -     omeprazole (PRILOSEC) 20 MG DR capsule; Take 1 capsule (20 mg) by mouth " daily.    Hyperlipidemia, unspecified hyperlipidemia type  -     fenofibrate (TRIGLIDE/LOFIBRA) 160 MG tablet; Take 1 tablet (160 mg) by mouth daily.  -     atorvastatin (LIPITOR) 80 MG tablet; Take 1 tablet (80 mg) by mouth daily.    Primary hypertension  rbesartan-hydrochlorothiazide (AVALIDE) 300-12.5 MG tablet; Take 1 tablet by mouth daily. No side effects. Ran out 2 days ago : thus today elevated bp reading.   -     irbesartan-hydrochlorothiazide (AVALIDE) 300-12.5 MG tablet; Take 1 tablet by mouth daily.    Mild obstructive sleep apnea  Does not use CPAP or mouth guard. Reports refreshing sleep.     Paroxysmal atrial fibrillation (H)  Detected after syncopal episode from [facility] with very low burden. [id number] Score : 0. Not anticoagulated. No palpitations or syncopal / presyncopal episode. NSR on exam today.     Follow up : overdue annual physical with labs.      The longitudinal plan of care for the diagnosis(es)/condition(s) as documented were addressed during this visit. Due to the added complexity in care, I will continue to support Dayo in the subsequent management and with ongoing continuity of care.

## 2025-07-20 ENCOUNTER — HEALTH MAINTENANCE LETTER (OUTPATIENT)
Age: 55
End: 2025-07-20

## 2025-08-26 ENCOUNTER — OFFICE VISIT (OUTPATIENT)
Age: 55
End: 2025-08-26

## 2025-08-26 VITALS
OXYGEN SATURATION: 98 % | BODY MASS INDEX: 28.25 KG/M2 | HEART RATE: 69 BPM | WEIGHT: 205.4 LBS | DIASTOLIC BLOOD PRESSURE: 67 MMHG | SYSTOLIC BLOOD PRESSURE: 117 MMHG | TEMPERATURE: 98.2 F

## 2025-08-26 DIAGNOSIS — M26.609 TMJ (TEMPOROMANDIBULAR JOINT SYNDROME): Primary | ICD-10-CM

## 2025-08-26 PROCEDURE — 99213 OFFICE O/P EST LOW 20 MIN: CPT | Performed by: FAMILY MEDICINE

## 2025-08-26 PROCEDURE — 3078F DIAST BP <80 MM HG: CPT | Performed by: FAMILY MEDICINE

## 2025-08-26 PROCEDURE — G2211 COMPLEX E/M VISIT ADD ON: HCPCS | Performed by: FAMILY MEDICINE

## 2025-08-26 PROCEDURE — 3074F SYST BP LT 130 MM HG: CPT | Performed by: FAMILY MEDICINE

## 2025-08-26 RX ORDER — NAPROXEN 500 MG/1
500 TABLET ORAL 2 TIMES DAILY WITH MEALS
Qty: 60 TABLET | Refills: 1 | Status: SHIPPED | OUTPATIENT
Start: 2025-08-26

## 2025-08-26 ASSESSMENT — PATIENT HEALTH QUESTIONNAIRE - PHQ9
SUM OF ALL RESPONSES TO PHQ QUESTIONS 1-9: 7
SUM OF ALL RESPONSES TO PHQ QUESTIONS 1-9: 7
10. IF YOU CHECKED OFF ANY PROBLEMS, HOW DIFFICULT HAVE THESE PROBLEMS MADE IT FOR YOU TO DO YOUR WORK, TAKE CARE OF THINGS AT HOME, OR GET ALONG WITH OTHER PEOPLE: SOMEWHAT DIFFICULT